# Patient Record
Sex: MALE | Race: WHITE | Employment: OTHER | URBAN - NONMETROPOLITAN AREA
[De-identification: names, ages, dates, MRNs, and addresses within clinical notes are randomized per-mention and may not be internally consistent; named-entity substitution may affect disease eponyms.]

---

## 2017-02-23 DIAGNOSIS — K21.9 GASTROESOPHAGEAL REFLUX DISEASE, ESOPHAGITIS PRESENCE NOT SPECIFIED: ICD-10-CM

## 2017-02-23 RX ORDER — PANTOPRAZOLE SODIUM 40 MG/1
TABLET, DELAYED RELEASE ORAL
Qty: 90 TABLET | Refills: 0 | Status: SHIPPED | OUTPATIENT
Start: 2017-02-23 | End: 2017-07-17 | Stop reason: SDUPTHER

## 2017-07-17 ENCOUNTER — OFFICE VISIT (OUTPATIENT)
Dept: FAMILY MEDICINE CLINIC | Age: 62
End: 2017-07-17
Payer: COMMERCIAL

## 2017-07-17 VITALS
WEIGHT: 200 LBS | SYSTOLIC BLOOD PRESSURE: 138 MMHG | OXYGEN SATURATION: 98 % | BODY MASS INDEX: 27.09 KG/M2 | DIASTOLIC BLOOD PRESSURE: 80 MMHG | HEART RATE: 73 BPM | HEIGHT: 72 IN | TEMPERATURE: 97.7 F

## 2017-07-17 DIAGNOSIS — K90.41 GLUTEN INTOLERANCE: ICD-10-CM

## 2017-07-17 DIAGNOSIS — N52.9 ERECTILE DYSFUNCTION, UNSPECIFIED ERECTILE DYSFUNCTION TYPE: Primary | ICD-10-CM

## 2017-07-17 DIAGNOSIS — M19.90 ARTHRITIS: ICD-10-CM

## 2017-07-17 DIAGNOSIS — K21.9 GASTROESOPHAGEAL REFLUX DISEASE, ESOPHAGITIS PRESENCE NOT SPECIFIED: ICD-10-CM

## 2017-07-17 PROCEDURE — 99214 OFFICE O/P EST MOD 30 MIN: CPT | Performed by: FAMILY MEDICINE

## 2017-07-17 RX ORDER — SILDENAFIL 100 MG/1
100 TABLET, FILM COATED ORAL PRN
Qty: 30 TABLET | Refills: 3 | Status: SHIPPED | OUTPATIENT
Start: 2017-07-17 | End: 2017-07-17 | Stop reason: CLARIF

## 2017-07-17 RX ORDER — DAPSONE 25 MG/1
25 TABLET ORAL DAILY
Qty: 90 TABLET | Refills: 1 | Status: SHIPPED | OUTPATIENT
Start: 2017-07-17

## 2017-07-17 RX ORDER — CELECOXIB 200 MG/1
200 CAPSULE ORAL DAILY
Qty: 60 CAPSULE | Refills: 3 | Status: SHIPPED | OUTPATIENT
Start: 2017-07-17

## 2017-07-17 RX ORDER — PANTOPRAZOLE SODIUM 40 MG/1
TABLET, DELAYED RELEASE ORAL
Qty: 90 TABLET | Refills: 0 | Status: SHIPPED | OUTPATIENT
Start: 2017-07-17 | End: 2017-07-28 | Stop reason: SDUPTHER

## 2017-07-17 RX ORDER — SILDENAFIL 100 MG/1
100 TABLET, FILM COATED ORAL PRN
Qty: 10 TABLET | Refills: 3 | Status: SHIPPED | OUTPATIENT
Start: 2017-07-17

## 2017-07-17 ASSESSMENT — ENCOUNTER SYMPTOMS
HOARSE VOICE: 0
SORE THROAT: 0
BELCHING: 0
SINUS PRESSURE: 0
COUGH: 0
HEARTBURN: 1
CHOKING: 0
ABDOMINAL PAIN: 0
TROUBLE SWALLOWING: 0
NAUSEA: 0

## 2017-07-25 ENCOUNTER — TELEPHONE (OUTPATIENT)
Dept: FAMILY MEDICINE CLINIC | Age: 62
End: 2017-07-25

## 2017-07-28 DIAGNOSIS — K21.9 GASTROESOPHAGEAL REFLUX DISEASE, ESOPHAGITIS PRESENCE NOT SPECIFIED: ICD-10-CM

## 2017-07-28 RX ORDER — PANTOPRAZOLE SODIUM 40 MG/1
TABLET, DELAYED RELEASE ORAL
Qty: 30 TABLET | Refills: 0 | Status: SHIPPED | OUTPATIENT
Start: 2017-07-28

## 2019-01-02 ENCOUNTER — OFFICE VISIT (OUTPATIENT)
Dept: GASTROENTEROLOGY | Facility: CLINIC | Age: 64
End: 2019-01-02

## 2019-01-02 VITALS
HEIGHT: 72 IN | DIASTOLIC BLOOD PRESSURE: 80 MMHG | BODY MASS INDEX: 27.36 KG/M2 | OXYGEN SATURATION: 98 % | HEART RATE: 77 BPM | SYSTOLIC BLOOD PRESSURE: 132 MMHG | WEIGHT: 202 LBS

## 2019-01-02 DIAGNOSIS — Z86.010 HX OF ADENOMATOUS COLONIC POLYPS: Primary | ICD-10-CM

## 2019-01-02 DIAGNOSIS — Z78.9 NONSMOKER: ICD-10-CM

## 2019-01-02 DIAGNOSIS — K60.2 ANAL FISSURE: ICD-10-CM

## 2019-01-02 DIAGNOSIS — R19.4 BOWEL HABIT CHANGES: ICD-10-CM

## 2019-01-02 DIAGNOSIS — K62.5 BRBPR (BRIGHT RED BLOOD PER RECTUM): ICD-10-CM

## 2019-01-02 PROBLEM — Z86.0101 HX OF ADENOMATOUS COLONIC POLYPS: Status: ACTIVE | Noted: 2019-01-02

## 2019-01-02 PROCEDURE — 99204 OFFICE O/P NEW MOD 45 MIN: CPT | Performed by: CLINICAL NURSE SPECIALIST

## 2019-01-02 RX ORDER — PANTOPRAZOLE SODIUM 40 MG/1
TABLET, DELAYED RELEASE ORAL DAILY
COMMUNITY
Start: 2017-07-28 | End: 2021-03-15

## 2019-01-02 RX ORDER — DAPSONE 25 MG/1
25 TABLET ORAL AS NEEDED
COMMUNITY
Start: 2017-07-17 | End: 2022-07-08 | Stop reason: SDUPTHER

## 2019-01-02 NOTE — PROGRESS NOTES
Chao Yadav Jr.  1955 1/2/2019  Chief Complaint   Patient presents with   • GI Problem     Rectal pain     Subjective   HPI  Chao Yadav Jr. is a 63 y.o. male who presents as a referral for complaint of change in bowels that began approx 1 year ago more constipation he had a severe episode at time. He has some rectal bleeding brb at that time small to moderate amount. No certain triggers. It now comes and goes. He tries to manage with diet. He says that he was working on a house and he fell and felt like he pulled something. He has had a rectal exam and he was diagnosed with a fissure and suggested he follow up with us it improved with cream and went away. He says that he did it again and similar symptoms for approx 3 days in December again. He has no complaints of nausea or vomiting. No change in bowels. No wt loss. No melena. There is NO family hx for colon cancer. No abdominal pain.   Past Medical History:   Diagnosis Date   • GERD (gastroesophageal reflux disease)    • Hx of colonic polyp    • Hypercholesteremia      Past Surgical History:   Procedure Laterality Date   • COLONOSCOPY W/ POLYPECTOMY  04/17/2014    Tubular adenoma at 60 cm repeat exam in 3 years   • ENDOSCOPY  04/17/2014    Small HH otherwise normal exam   • KNEE SURGERY      x 2   • LUNG SURGERY      Due to complicated pneumonia   • ROTATOR CUFF REPAIR       Outpatient Medications Marked as Taking for the 1/2/19 encounter (Office Visit) with Esther Moore APRN   Medication Sig Dispense Refill   • dapsone 25 MG tablet Daily.     • pantoprazole (PROTONIX) 40 MG EC tablet Daily.       No Known Allergies  Social History     Socioeconomic History   • Marital status:      Spouse name: Not on file   • Number of children: Not on file   • Years of education: Not on file   • Highest education level: Not on file   Social Needs   • Financial resource strain: Not on file   • Food insecurity - worry: Not on file   • Food  "insecurity - inability: Not on file   • Transportation needs - medical: Not on file   • Transportation needs - non-medical: Not on file   Occupational History   • Not on file   Tobacco Use   • Smoking status: Never Smoker   • Smokeless tobacco: Never Used   Substance and Sexual Activity   • Alcohol use: Yes     Comment: Social   • Drug use: Not on file   • Sexual activity: Not on file   Other Topics Concern   • Not on file   Social History Narrative   • Not on file     Family History   Problem Relation Age of Onset   • Colon cancer Neg Hx    • Colon polyps Neg Hx      Health Maintenance   Topic Date Due   • ANNUAL PHYSICAL  09/20/1957   • HEPATITIS A VACCINE ADULT (1 of 2) 09/20/1972   • TDAP/TD VACCINES (1 - Tdap) 09/20/1973   • ZOSTER VACCINE (1 of 2) 09/20/2004   • HEPATITIS C SCREENING  06/22/2017   • INFLUENZA VACCINE  08/01/2018   • LIPID PANEL  01/02/2019   • COLONOSCOPY  04/17/2024       REVIEW OF SYSTEMS  General: well appearing, no fever chills or sweats, no unexplained wt loss  HEENT: no acute visual or hearing disturbances  Cardiovascular: No chest pain or palpitations  Pulmonary: No shortness of breath, coughing, wheezing or hemoptysis  Abdomen: constipation, rectal bleeding   : No burning, urgency, hematuria, or dysuria  Musculoskeletal: No joint pain or stiffness  Peripheral: no edema  Skin: No lesions or rashes  Neuro: No dizziness, headaches, stroke, syncope  Endocrine: No hot or cold intolerances  Hematological: No blood dyscrasias    Objective   Vitals:    01/02/19 0913   BP: 132/80   Pulse: 77   SpO2: 98%   Weight: 91.6 kg (202 lb)   Height: 182.9 cm (72\")     Body mass index is 27.4 kg/m².  Patient's Body mass index is 27.4 kg/m². BMI is above normal parameters. Recommendations include: nutrition counseling.      PHYSICAL EXAM  General: age appropriate well nourished well appearing, no acute distress  Head: normocephalic and atraumatic  Global assessment-supple  Neck-No JVD noted, no " lymphadenopathy  Pulmonary-clear to auscultation bilaterally, normal respiratory effort  Cardiovascular-normal rate and rhythm, normal heart sounds, S1 and S2 noted  Abdomen-soft, non tender, non distended, normal bowel sounds all 4 quadrants, no hepatosplenomegaly noted  Extremities-No clubbing cyanosis or edema  Neuro-Non focal, converses appropriately, awake, alert, oriented    Assessment/Plan     Chao was seen today for gi problem.    Diagnoses and all orders for this visit:    Hx of adenomatous colonic polyps  -     Case Request; Standing  -     Follow Anesthesia Guidelines / Standing Orders; Future  -     Implement Anesthesia Orders Day of Procedure; Standing  -     Obtain Informed Consent; Standing  -     Verify bowel prep was successful; Standing  -     Case Request    BRBPR (bright red blood per rectum)    Bowel habit changes    Nonsmoker    Anal fissure    Other orders  -     polyethylene glycol (GoLYTELY) 236 g solution; Take as directed by office instructions.    He is past due for his colonoscopy will do this to further evaluate I have suggested a fiber regimen as well as increased water intake.     COLONOSCOPY WITH ANESTHESIA (N/A)  Body mass index is 27.4 kg/m².    Patient instructions on prep prior to procedure provided to the patient.    All risks, benefits, alternatives, and indications of colonoscopy procedure have been discussed with the patient. Risks to include perforation of the colon requiring possible surgery or colostomy, risk of bleeding from biopsies or removal of colon tissue, possibility of missing a colon polyp or cancer, or adverse drug reaction.  Benefits to include the diagnosis and management of disease of the colon and rectum. Alternatives to include barium enema, radiographic evaluation, lab testing or no intervention. Pt verbalizes understanding and agrees.     Esther Moore, APRN  2019  9:34 AM      IF YOU SMOKE OR USE TOBACCO PLEASE READ THE FOLLOWIN minutes  reading provided    Why is smoking bad for me?  Smoking increases the risk of heart disease, lung disease, vascular disease, stroke, and cancer.     If you smoke, STOP!    If you would like more information on quitting smoking, please visit the [employer] website: [email or web address]   This link will provide additional resources including the QUIT line and the Beat the Pack support groups.     For more information:    Quit Now [facility]  1-800-QUIT-NOW  https://[email or web address]/en-US/    Obesity, Adult  Obesity is the condition of having too much total body fat. Being overweight or obese means that your weight is greater than what is considered healthy for your body size. Obesity is determined by a measurement called BMI. BMI is an estimate of body fat and is calculated from height and weight. For adults, a BMI of 30 or higher is considered obese.  Obesity can eventually lead to other health concerns and major illnesses, including:  · Stroke.  · Coronary artery disease (CAD).  · Type 2 diabetes.  · Some types of cancer, including cancers of the colon, breast, uterus, and gallbladder.  · Osteoarthritis.  · High blood pressure (hypertension).  · High cholesterol.  · Sleep apnea.  · Gallbladder stones.  · Infertility problems.  What are the causes?  The main cause of obesity is taking in (consuming) more calories than your body uses for energy. Other factors that contribute to this condition may include:  · Being born with genes that make you more likely to become obese.  · Having a medical condition that causes obesity. These conditions include:  ¨ Hypothyroidism.  ¨ Polycystic ovarian syndrome (PCOS).  ¨ Binge-eating disorder.  ¨ Cushing syndrome.  · Taking certain medicines, such as steroids, antidepressants, and seizure medicines.  · Not being physically active (sedentary lifestyle).  · Living where there are limited places to exercise safely or buy healthy  foods.  · Not getting enough sleep.  What increases the risk?  The following factors may increase your risk of this condition:  · Having a family history of obesity.  · Being a woman of -American descent.  · Being a man of  descent.  What are the signs or symptoms?  Having excessive body fat is the main symptom of this condition.  How is this diagnosed?  This condition may be diagnosed based on:  · Your symptoms.  · Your medical history.  · A physical exam. Your health care provider may measure:  ¨ Your BMI. If you are an adult with a BMI between 25 and less than 30, you are considered overweight. If you are an adult with a BMI of 30 or higher, you are considered obese.  ¨ The distances around your hips and your waist (circumferences). These may be compared to each other to help diagnose your condition.  ¨ Your skinfold thickness. Your health care provider may gently pinch a fold of your skin and measure it.  How is this treated?  Treatment for this condition often includes changing your lifestyle. Treatment may include some or all of the following:  · Dietary changes. Work with your health care provider and a dietitian to set a weight-loss goal that is healthy and reasonable for you. Dietary changes may include eating:  ¨ Smaller portions. A portion size is the amount of a particular food that is healthy for you to eat at one time. This varies from person to person.  ¨ Low-calorie or low-fat options.  ¨ More whole grains, fruits, and vegetables.  · Regular physical activity. This may include aerobic activity (cardio) and strength training.  · Medicine to help you lose weight. Your health care provider may prescribe medicine if you are unable to lose 1 pound a week after 6 weeks of eating more healthily and doing more physical activity.  · Surgery. Surgical options may include gastric banding and gastric bypass. Surgery may be done if:  ¨ Other treatments have not helped to improve your  condition.  ¨ You have a BMI of 40 or higher.  ¨ You have life-threatening health problems related to obesity.  Follow these instructions at home:     Eating and drinking     · Follow recommendations from your health care provider about what you eat and drink. Your health care provider may advise you to:  ¨ Limit fast foods, sweets, and processed snack foods.  ¨ Choose low-fat options, such as low-fat milk instead of whole milk.  ¨ Eat 5 or more servings of fruits or vegetables every day.  ¨ Eat at home more often. This gives you more control over what you eat.  ¨ Choose healthy foods when you eat out.  ¨ Learn what a healthy portion size is.  ¨ Keep low-fat snacks on hand.  ¨ Avoid sugary drinks, such as soda, fruit juice, iced tea sweetened with sugar, and flavored milk.  ¨ Eat a healthy breakfast.  · Drink enough water to keep your urine clear or pale yellow.  · Do not go without eating for long periods of time (do not fast) or follow a fad diet. Fasting and fad diets can be unhealthy and even dangerous.  Physical Activity   · Exercise regularly, as told by your health care provider. Ask your health care provider what types of exercise are safe for you and how often you should exercise.  · Warm up and stretch before being active.  · Cool down and stretch after being active.  · Rest between periods of activity.  Lifestyle   · Limit the time that you spend in front of your TV, computer, or video game system.  · Find ways to reward yourself that do not involve food.  · Limit alcohol intake to no more than 1 drink a day for nonpregnant women and 2 drinks a day for men. One drink equals 12 oz of beer, 5 oz of wine, or 1½ oz of hard liquor.  General instructions   · Keep a weight loss journal to keep track of the food you eat and how much you exercise you get.  · Take over-the-counter and prescription medicines only as told by your health care provider.  · Take vitamins and supplements only as told by your health care  provider.  · Consider joining a support group. Your health care provider may be able to recommend a support group.  · Keep all follow-up visits as told by your health care provider. This is important.  Contact a health care provider if:  · You are unable to meet your weight loss goal after 6 weeks of dietary and lifestyle changes.  This information is not intended to replace advice given to you by your health care provider. Make sure you discuss any questions you have with your health care provider.  Document Released: 01/25/2006 Document Revised: 05/22/2017 Document Reviewed: 10/05/2016  Elsevier Interactive Patient Education © 2017 Elsevier Inc.

## 2019-03-25 ENCOUNTER — ANESTHESIA (OUTPATIENT)
Dept: GASTROENTEROLOGY | Facility: HOSPITAL | Age: 64
End: 2019-03-25

## 2019-03-25 ENCOUNTER — ANESTHESIA EVENT (OUTPATIENT)
Dept: GASTROENTEROLOGY | Facility: HOSPITAL | Age: 64
End: 2019-03-25

## 2019-03-25 ENCOUNTER — HOSPITAL ENCOUNTER (OUTPATIENT)
Facility: HOSPITAL | Age: 64
Setting detail: HOSPITAL OUTPATIENT SURGERY
Discharge: HOME | End: 2019-03-25
Attending: INTERNAL MEDICINE | Admitting: INTERNAL MEDICINE

## 2019-03-25 VITALS
WEIGHT: 197 LBS | DIASTOLIC BLOOD PRESSURE: 90 MMHG | HEART RATE: 65 BPM | RESPIRATION RATE: 13 BRPM | HEIGHT: 72 IN | BODY MASS INDEX: 26.68 KG/M2 | SYSTOLIC BLOOD PRESSURE: 117 MMHG | OXYGEN SATURATION: 98 %

## 2019-03-25 DIAGNOSIS — Z86.010 HX OF ADENOMATOUS COLONIC POLYPS: ICD-10-CM

## 2019-03-25 PROCEDURE — 25010000002 PROPOFOL 10 MG/ML EMULSION: Performed by: NURSE ANESTHETIST, CERTIFIED REGISTERED

## 2019-03-25 PROCEDURE — 45385 COLONOSCOPY W/LESION REMOVAL: CPT | Performed by: INTERNAL MEDICINE

## 2019-03-25 PROCEDURE — 88305 TISSUE EXAM BY PATHOLOGIST: CPT | Performed by: INTERNAL MEDICINE

## 2019-03-25 RX ORDER — LIDOCAINE HYDROCHLORIDE 20 MG/ML
INJECTION, SOLUTION INFILTRATION; PERINEURAL AS NEEDED
Status: DISCONTINUED | OUTPATIENT
Start: 2019-03-25 | End: 2019-03-25 | Stop reason: SURG

## 2019-03-25 RX ORDER — PROPOFOL 10 MG/ML
VIAL (ML) INTRAVENOUS AS NEEDED
Status: DISCONTINUED | OUTPATIENT
Start: 2019-03-25 | End: 2019-03-25 | Stop reason: SURG

## 2019-03-25 RX ORDER — SODIUM CHLORIDE 9 MG/ML
500 INJECTION, SOLUTION INTRAVENOUS CONTINUOUS PRN
Status: DISCONTINUED | OUTPATIENT
Start: 2019-03-25 | End: 2019-03-25 | Stop reason: HOSPADM

## 2019-03-25 RX ORDER — SODIUM CHLORIDE 0.9 % (FLUSH) 0.9 %
3 SYRINGE (ML) INJECTION AS NEEDED
Status: DISCONTINUED | OUTPATIENT
Start: 2019-03-25 | End: 2019-03-25 | Stop reason: HOSPADM

## 2019-03-25 RX ADMIN — SODIUM CHLORIDE 500 ML: 9 INJECTION, SOLUTION INTRAVENOUS at 08:07

## 2019-03-25 RX ADMIN — LIDOCAINE HYDROCHLORIDE 80 MG: 20 INJECTION, SOLUTION INFILTRATION; PERINEURAL at 08:36

## 2019-03-25 RX ADMIN — PROPOFOL 300 MG: 10 INJECTION, EMULSION INTRAVENOUS at 08:36

## 2019-03-25 NOTE — H&P
"[facility]  Pre Procedure History & Physical    Chief Complaint:   Rectal bleeding    Subjective     HPI:   Here for colonoscopy.  Rectal bleeding.  Also with history of adenomatous polyps    Past Medical History:   Past Medical History:   Diagnosis Date   • Arthritis    • GERD (gastroesophageal reflux disease)    • History of transfusion    • Hx of colonic polyp    • Hypercholesteremia        Past Surgical History:  Past Surgical History:   Procedure Laterality Date   • COLONOSCOPY W/ POLYPECTOMY  04/17/2014    Tubular adenoma at 60 cm repeat exam in 3 years   • ENDOSCOPY  04/17/2014    Small HH otherwise normal exam   • KNEE SURGERY      x 2   • LUNG SURGERY      Due to complicated pneumonia   • ROTATOR CUFF REPAIR         Family History:  Family History   Problem Relation Age of Onset   • Colon cancer Neg Hx    • Colon polyps Neg Hx        Social History:   reports that he has never smoked. He has quit using smokeless tobacco. His smokeless tobacco use included chew. He reports that he drinks alcohol.    Medications:   Prior to Admission medications    Medication Sig Start Date End Date Taking? Authorizing Provider   dapsone 25 MG tablet Daily. 7/17/17  Yes ProviderMaverick MD   pantoprazole (PROTONIX) 40 MG EC tablet Daily. 7/28/17  Yes Provider, MD Maverick   polyethylene glycol (GoLYTELY) 236 g solution Take as directed by office instructions. 1/2/19  Yes Esther Moore APRN       Allergies:  Gluten meal and Statins    Objective     Blood pressure 138/83, pulse 73, resp. rate 18, height 182.9 cm (72\"), weight 89.4 kg (197 lb), SpO2 98 %.    Physical Exam   Constitutional: Pt is oriented to person, place, and in no distress.   HENT: Mouth/Throat: Oropharynx is clear.   Cardiovascular: Normal rate, regular rhythm.    Pulmonary/Chest: Effort normal. No respiratory distress. No  wheezes.   Abdominal: Soft. Non-distended.  Skin: Skin is warm and dry.   Psychiatric: Mood, memory, " affect and judgment appear normal.     Assessment/Plan     Diagnosis:  Rectal bleeding    Anticipated Surgical Procedure:    Proceed with colonoscopy as scheduled    The following major R/B/A were discussed with the patient, however the list is not all inclusive . Risk:  Bleeding (immediate and delayed), perforation (rupture or tear), reaction to medication, missed lesion/cancer, pain during the procedure, infection, need for surgery, need for ostomy, need for mechanical ventilation (breathing machine), death.  Benefits: removal of polyp/tissue, burn/clip/or inject to stop bleeding, removal of foreign body, dilate any stricture.  Alternatives: Xray or CT, surgery, do nothing with associated risk   The patient was given time to ask question and received explanation, and agrees to proceed as per History and Physical.   No guarantee given or expressed.    EMR Dragon/transcription disclaimer: Much of this encounter note is an electronic transcription/translation of spoken language to printed text.  The electronic translation of spoken language may permit erroneous, or at times, nonsensical words or phrases to be inadvertently transcribed.  Although I have reviewed the note for such errors, some may still exist.    Kota Silvestre MD  8:36 AM  3/25/2019

## 2019-03-25 NOTE — ANESTHESIA PREPROCEDURE EVALUATION
Anesthesia Evaluation     Patient summary reviewed   no history of anesthetic complications:  NPO Solid Status: > 8 hours             Airway   Mallampati: I  TM distance: >3 FB  Neck ROM: full  Dental      Pulmonary - negative pulmonary ROS   Cardiovascular - negative cardio ROS  Exercise tolerance: excellent (>7 METS)        Neuro/Psych- negative ROS  GI/Hepatic/Renal/Endo    (+)  GERD,      Musculoskeletal     Abdominal    Substance History      OB/GYN          Other                        Anesthesia Plan    ASA 2     MAC     intravenous induction   Anesthetic plan, all risks, benefits, and alternatives have been provided, discussed and informed consent has been obtained with: patient.

## 2019-03-25 NOTE — ANESTHESIA POSTPROCEDURE EVALUATION
Patient: Chao Yadav Jr.    Procedure Summary     Date:  03/25/19 Room / Location:   [facility] /  [facility]    Anesthesia Start:  0834 Anesthesia Stop:  0853    Procedure:  COLONOSCOPY WITH ANESTHESIA (N/A ) Diagnosis:       Hx of adenomatous colonic polyps      (Hx of adenomatous colonic polyps [Z86.010])    Surgeon:  Kota Silvestre MD Provider:  Spencer [place] CRNA    Anesthesia Type:  MAC ASA Status:  2          Anesthesia Type: MAC  Last vitals  BP   117/90 (03/25/19 0910)   Temp       Pulse   65 (03/25/19 0910)   Resp   13 (03/25/19 0910)     SpO2   98 % (03/25/19 0910)     Post Anesthesia Care and Evaluation    Patient location during evaluation: PHASE II  Patient participation: complete - patient participated  Level of consciousness: awake  Pain management: adequate  Airway patency: patent  Anesthetic complications: No anesthetic complications  Respiratory status: acceptable  Hydration status: acceptable

## 2019-03-26 LAB
CYTO UR: NORMAL
LAB AP CASE REPORT: NORMAL
PATH REPORT.FINAL DX SPEC: NORMAL
PATH REPORT.GROSS SPEC: NORMAL

## 2019-05-31 ENCOUNTER — OUTSIDE FACILITY SERVICE (OUTPATIENT)
Dept: PULMONOLOGY | Facility: CLINIC | Age: 64
End: 2019-05-31

## 2019-05-31 ENCOUNTER — HOSPITAL ENCOUNTER (OUTPATIENT)
Dept: PULMONOLOGY | Age: 64
Discharge: HOME | End: 2019-05-31
Payer: COMMERCIAL

## 2019-05-31 PROCEDURE — 94729 DIFFUSING CAPACITY: CPT

## 2019-05-31 PROCEDURE — 94729 DIFFUSING CAPACITY: CPT | Performed by: INTERNAL MEDICINE

## 2019-05-31 PROCEDURE — 6360000002 HC RX W HCPCS: Performed by: INTERNAL MEDICINE

## 2019-05-31 PROCEDURE — 94060 EVALUATION OF WHEEZING: CPT | Performed by: INTERNAL MEDICINE

## 2019-05-31 PROCEDURE — 94727 GAS DIL/WSHOT DETER LNG VOL: CPT

## 2019-05-31 PROCEDURE — 94060 EVALUATION OF WHEEZING: CPT

## 2019-05-31 PROCEDURE — 94727 GAS DIL/WSHOT DETER LNG VOL: CPT | Performed by: INTERNAL MEDICINE

## 2019-05-31 RX ORDER — ALBUTEROL SULFATE 2.5 MG/3ML
2.5 SOLUTION RESPIRATORY (INHALATION) EVERY 6 HOURS PRN
Status: DISCONTINUED | OUTPATIENT
Start: 2019-05-31 | End: 2019-06-02 | Stop reason: HOSPADM

## 2019-05-31 RX ADMIN — ALBUTEROL SULFATE 2.5 MG: 2.5 SOLUTION RESPIRATORY (INHALATION) at 10:53

## 2019-06-04 NOTE — PROCEDURES
[facility]LE[facility] [address] [facility]                               PULMONARY FUNCTION    PATIENT NAME: Travis Yeung                      :        1955  MED REC NO:   [id number]                              ROOM:  ACCOUNT NO:   [de-identified]                           ADMIT DATE: 2019  PROVIDER:     Emilee Jin MD      DATE OF PROCEDURE:  2019    PULMONARY FUNCTION TESTS    IMPRESSION:  1. Spirometry shows borderline results with borderline airflow  obstruction. 2.  Following bronchodilator, spirometry is normal.  3.  Mid flow is reduced. 4.  Maximum ventilation is normal.  5.  Lung volume measurements show elevated residual volume and total  lung capacity suggesting hyperinflation and gas trapping, both.   6.  Diffusion capacity is normal.        Aletha Rutherford MD    D: 2019 16:05:24      T: 2019 16:40:19     [id number]  Job#: [id number]     Doc#: [id number]    CC:

## 2021-03-15 ENCOUNTER — OFFICE VISIT (OUTPATIENT)
Dept: FAMILY MEDICINE CLINIC | Facility: CLINIC | Age: 66
End: 2021-03-15

## 2021-03-15 VITALS
DIASTOLIC BLOOD PRESSURE: 92 MMHG | HEART RATE: 77 BPM | TEMPERATURE: 98.4 F | WEIGHT: 195.5 LBS | BODY MASS INDEX: 26.48 KG/M2 | SYSTOLIC BLOOD PRESSURE: 131 MMHG | OXYGEN SATURATION: 96 % | HEIGHT: 72 IN

## 2021-03-15 DIAGNOSIS — B02.9 HERPES ZOSTER WITHOUT COMPLICATION: Primary | ICD-10-CM

## 2021-03-15 PROCEDURE — 99213 OFFICE O/P EST LOW 20 MIN: CPT | Performed by: NURSE PRACTITIONER

## 2021-03-15 RX ORDER — DEXLANSOPRAZOLE 60 MG/1
60 CAPSULE, DELAYED RELEASE ORAL DAILY
COMMUNITY
End: 2022-01-03

## 2021-03-15 RX ORDER — DICLOFENAC SODIUM AND MISOPROSTOL 75; 200 MG/1; UG/1
1 TABLET, DELAYED RELEASE ORAL AS NEEDED
COMMUNITY
End: 2022-05-11

## 2021-03-15 RX ORDER — VALACYCLOVIR HYDROCHLORIDE 1 G/1
1000 TABLET, FILM COATED ORAL 3 TIMES DAILY
Qty: 21 TABLET | Refills: 0 | Status: SHIPPED | OUTPATIENT
Start: 2021-03-15 | End: 2021-03-22

## 2021-03-15 NOTE — PROGRESS NOTES
Chief Complaint  Herpes Zoster (Patient has shingles on right side of trunk moving around to the front since Friday)    Subjective    History of Present Illness {CC  Problem List  Visit Diagnosis   Encounters  Notes  Medications  Labs  Result Review Imaging  Media :23}     Patient presents to [facility] for   Patient has shingles on right side trunk extending around to the front since Friday, burning pain and uncomfortable    Herpes Zoster  This is a new problem. The current episode started in the past 7 days. The problem occurs constantly. The problem has been gradually worsening. Associated symptoms include a rash. Pertinent negatives include no abdominal pain, arthralgias, chest pain, coughing, diaphoresis, fatigue, fever, myalgias, numbness, sore throat, vomiting or weakness.        Review of Systems   Constitutional: Negative for appetite change, diaphoresis, fatigue and fever.   HENT: Negative for ear pain, hearing loss, mouth sores, sinus pressure, sneezing, sore throat and voice change.    Eyes: Negative for discharge, itching and visual disturbance.   Respiratory: Negative for cough, shortness of breath and wheezing.    Cardiovascular: Negative for chest pain and palpitations.   Gastrointestinal: Negative for abdominal pain, diarrhea and vomiting.   Musculoskeletal: Negative for arthralgias, back pain and myalgias.   Skin: Positive for rash. Negative for bruise.   Neurological: Negative for dizziness, seizures, weakness, numbness and headache.   Hematological: Negative for adenopathy. Does not bruise/bleed easily.   Psychiatric/Behavioral: Negative for agitation, dysphoric mood, sleep disturbance and depressed mood. The patient is not nervous/anxious.        I have reviewed and agree with the HPI and ROS information as above.  Griselda Burrell, APRN     Objective   Vital Signs:   /92 (BP Location: Left arm, Patient Position: Sitting)   Pulse 77   Temp 98.4  "°F (36.9 °C)   Ht 182.9 cm (72\")   Wt 88.7 kg (195 lb 8 oz)   SpO2 96%   BMI 26.51 kg/m²       Physical Exam  Vitals and nursing note reviewed.   Constitutional:       Appearance: Normal appearance. He is well-developed.   HENT:      Head: Normocephalic and atraumatic.      Right Ear: Tympanic membrane, ear canal and external ear normal.      Left Ear: Tympanic membrane, ear canal and external ear normal.      Nose: Nose normal. No septal deviation, nasal tenderness or congestion.      Mouth/Throat:      Lips: Pink. No lesions.      Mouth: Mucous membranes are moist. No oral lesions.      Dentition: Normal dentition.      Pharynx: Oropharynx is clear. No pharyngeal swelling, oropharyngeal exudate or posterior oropharyngeal erythema.   Eyes:      General: Lids are normal. Vision grossly intact. No scleral icterus.        Right eye: No discharge.         Left eye: No discharge.      Extraocular Movements: Extraocular movements intact.      Conjunctiva/sclera: Conjunctivae normal.      Right eye: Right conjunctiva is not injected.      Left eye: Left conjunctiva is not injected.      Pupils: Pupils are equal, round, and reactive to light.   Neck:      Thyroid: No thyroid mass.      Trachea: Trachea normal.   Cardiovascular:      Rate and Rhythm: Normal rate and regular rhythm.      Heart sounds: Normal heart sounds. No murmur. No gallop.    Pulmonary:      Effort: Pulmonary effort is normal.      Breath sounds: Normal breath sounds and air entry. No wheezing, rhonchi or rales.   Musculoskeletal:         General: No tenderness or deformity. Normal range of motion.      Cervical back: Full passive range of motion without pain, normal range of motion and neck supple.      Thoracic back: Normal.      Right lower leg: No edema.      Left lower leg: No edema.   Skin:     General: Skin is warm and dry.      Coloration: Skin is not jaundiced.      Findings: Lesion and rash present. Rash is pustular and vesicular.           "   Comments: Vesicular rash to right upper back   Neurological:      Mental Status: He is alert and oriented to person, place, and time.      Cranial Nerves: Cranial nerves are intact.      Sensory: Sensation is intact.      Motor: Motor function is intact.      Coordination: Coordination is intact.      Gait: Gait is intact.      Deep Tendon Reflexes: Reflexes are normal and symmetric.   Psychiatric:         Mood and Affect: Mood and affect normal.         Behavior: Behavior normal.         Judgment: Judgment normal.                   Assessment and Plan    Patient's Body mass index is 26.51 kg/m². BMI is within normal parameters. No follow-up required..    Problem List Items Addressed This Visit     None      Visit Diagnoses     Herpes zoster without complication    -  Primary    Relevant Medications    valACYclovir (Valtrex) 1000 MG tablet      Patient states that he started noticing a burning sensation to his back starting Friday. He then broke out in painful blisters. He has had shingles before and knew exactly what it was. Will treat with Valtrex at this time. I also sent Shingles B Gone to [facility]. Patient wanted it noted that he had the COVID vaccine 8 days ago also.         Follow Up   Return if symptoms worsen or fail to improve.  Patient was given instructions and counseling regarding his condition or for health maintenance advice. Please see specific information pulled into the AVS if appropriate.

## 2021-05-31 ENCOUNTER — APPOINTMENT (OUTPATIENT)
Dept: GENERAL RADIOLOGY | Facility: HOSPITAL | Age: 66
End: 2021-05-31

## 2021-05-31 ENCOUNTER — HOSPITAL ENCOUNTER (EMERGENCY)
Facility: HOSPITAL | Age: 66
Discharge: HOME | End: 2021-05-31
Attending: EMERGENCY MEDICINE | Admitting: EMERGENCY MEDICINE

## 2021-05-31 VITALS
OXYGEN SATURATION: 96 % | HEART RATE: 64 BPM | TEMPERATURE: 97.9 F | DIASTOLIC BLOOD PRESSURE: 81 MMHG | RESPIRATION RATE: 20 BRPM | WEIGHT: 200 LBS | HEIGHT: 72 IN | SYSTOLIC BLOOD PRESSURE: 128 MMHG | BODY MASS INDEX: 27.09 KG/M2

## 2021-05-31 DIAGNOSIS — K52.9 DIARRHEAL DISEASE: Primary | ICD-10-CM

## 2021-05-31 LAB
ALBUMIN SERPL-MCNC: 4.1 G/DL (ref 3.5–5.2)
ALBUMIN/GLOB SERPL: 2.2 G/DL
ALP SERPL-CCNC: 84 U/L (ref 39–117)
ALT SERPL W P-5'-P-CCNC: 25 U/L (ref 1–41)
ANION GAP SERPL CALCULATED.3IONS-SCNC: 11 MMOL/L (ref 5–15)
AST SERPL-CCNC: 31 U/L (ref 1–40)
BASOPHILS # BLD AUTO: 0.02 10*3/MM3 (ref 0–0.2)
BASOPHILS NFR BLD AUTO: 0.2 % (ref 0–1.5)
BILIRUB SERPL-MCNC: 0.3 MG/DL (ref 0–1.2)
BUN SERPL-MCNC: 13 MG/DL (ref 8–23)
BUN/CREAT SERPL: 12.6 (ref 7–25)
CALCIUM SPEC-SCNC: 8.6 MG/DL (ref 8.6–10.5)
CHLORIDE SERPL-SCNC: 108 MMOL/L (ref 98–107)
CO2 SERPL-SCNC: 23 MMOL/L (ref 22–29)
CREAT SERPL-MCNC: 1.03 MG/DL (ref 0.76–1.27)
DEPRECATED RDW RBC AUTO: 40.6 FL (ref 37–54)
EOSINOPHIL # BLD AUTO: 0.09 10*3/MM3 (ref 0–0.4)
EOSINOPHIL NFR BLD AUTO: 1 % (ref 0.3–6.2)
ERYTHROCYTE [DISTWIDTH] IN BLOOD BY AUTOMATED COUNT: 12.4 % (ref 12.3–15.4)
GFR SERPL CREATININE-BSD FRML MDRD: 72 ML/MIN/1.73
GLOBULIN UR ELPH-MCNC: 1.9 GM/DL
GLUCOSE SERPL-MCNC: 89 MG/DL (ref 65–99)
HCT VFR BLD AUTO: 39.2 % (ref 37.5–51)
HGB BLD-MCNC: 13.3 G/DL (ref 13–17.7)
IMM GRANULOCYTES # BLD AUTO: 0.02 10*3/MM3 (ref 0–0.05)
IMM GRANULOCYTES NFR BLD AUTO: 0.2 % (ref 0–0.5)
LYMPHOCYTES # BLD AUTO: 1.24 10*3/MM3 (ref 0.7–3.1)
LYMPHOCYTES NFR BLD AUTO: 14.2 % (ref 19.6–45.3)
MCH RBC QN AUTO: 30 PG (ref 26.6–33)
MCHC RBC AUTO-ENTMCNC: 33.9 G/DL (ref 31.5–35.7)
MCV RBC AUTO: 88.5 FL (ref 79–97)
MONOCYTES # BLD AUTO: 0.63 10*3/MM3 (ref 0.1–0.9)
MONOCYTES NFR BLD AUTO: 7.2 % (ref 5–12)
NEUTROPHILS NFR BLD AUTO: 6.76 10*3/MM3 (ref 1.7–7)
NEUTROPHILS NFR BLD AUTO: 77.2 % (ref 42.7–76)
NRBC BLD AUTO-RTO: 0 /100 WBC (ref 0–0.2)
PLATELET # BLD AUTO: 207 10*3/MM3 (ref 140–450)
PMV BLD AUTO: 10.3 FL (ref 6–12)
POTASSIUM SERPL-SCNC: 4.1 MMOL/L (ref 3.5–5.2)
PROT SERPL-MCNC: 6 G/DL (ref 6–8.5)
RBC # BLD AUTO: 4.43 10*6/MM3 (ref 4.14–5.8)
SODIUM SERPL-SCNC: 142 MMOL/L (ref 136–145)
TROPONIN T SERPL-MCNC: <0.01 NG/ML (ref 0–0.03)
TROPONIN T SERPL-MCNC: <0.01 NG/ML (ref 0–0.03)
WBC # BLD AUTO: 8.76 10*3/MM3 (ref 3.4–10.8)

## 2021-05-31 PROCEDURE — 99284 EMERGENCY DEPT VISIT MOD MDM: CPT

## 2021-05-31 PROCEDURE — 93005 ELECTROCARDIOGRAM TRACING: CPT | Performed by: EMERGENCY MEDICINE

## 2021-05-31 PROCEDURE — 80053 COMPREHEN METABOLIC PANEL: CPT | Performed by: EMERGENCY MEDICINE

## 2021-05-31 PROCEDURE — 93010 ELECTROCARDIOGRAM REPORT: CPT | Performed by: INTERNAL MEDICINE

## 2021-05-31 PROCEDURE — 85025 COMPLETE CBC W/AUTO DIFF WBC: CPT | Performed by: EMERGENCY MEDICINE

## 2021-05-31 PROCEDURE — 71045 X-RAY EXAM CHEST 1 VIEW: CPT

## 2021-05-31 PROCEDURE — 84484 ASSAY OF TROPONIN QUANT: CPT | Performed by: EMERGENCY MEDICINE

## 2021-05-31 RX ORDER — SODIUM CHLORIDE 0.9 % (FLUSH) 0.9 %
10 SYRINGE (ML) INJECTION AS NEEDED
Status: DISCONTINUED | OUTPATIENT
Start: 2021-05-31 | End: 2021-05-31 | Stop reason: HOSPADM

## 2021-05-31 RX ORDER — ONDANSETRON 4 MG/1
4 TABLET, ORALLY DISINTEGRATING ORAL EVERY 8 HOURS PRN
Qty: 12 TABLET | Refills: 0 | Status: SHIPPED | OUTPATIENT
Start: 2021-05-31 | End: 2021-08-09

## 2021-06-01 LAB
QT INTERVAL: 430 MS
QTC INTERVAL: 464 MS

## 2021-08-09 ENCOUNTER — OFFICE VISIT (OUTPATIENT)
Dept: FAMILY MEDICINE CLINIC | Facility: CLINIC | Age: 66
End: 2021-08-09

## 2021-08-09 ENCOUNTER — LAB (OUTPATIENT)
Dept: LAB | Facility: HOSPITAL | Age: 66
End: 2021-08-09

## 2021-08-09 VITALS
HEART RATE: 62 BPM | DIASTOLIC BLOOD PRESSURE: 80 MMHG | WEIGHT: 202 LBS | HEIGHT: 72 IN | BODY MASS INDEX: 27.36 KG/M2 | OXYGEN SATURATION: 98 % | TEMPERATURE: 96.8 F | RESPIRATION RATE: 20 BRPM | SYSTOLIC BLOOD PRESSURE: 108 MMHG

## 2021-08-09 DIAGNOSIS — Z12.5 SCREENING FOR MALIGNANT NEOPLASM OF PROSTATE: ICD-10-CM

## 2021-08-09 DIAGNOSIS — E55.9 VITAMIN D DEFICIENCY: ICD-10-CM

## 2021-08-09 DIAGNOSIS — Z00.00 MEDICARE ANNUAL WELLNESS VISIT, SUBSEQUENT: ICD-10-CM

## 2021-08-09 DIAGNOSIS — K21.9 GASTROESOPHAGEAL REFLUX DISEASE WITHOUT ESOPHAGITIS: ICD-10-CM

## 2021-08-09 DIAGNOSIS — N52.9 ERECTILE DYSFUNCTION, UNSPECIFIED ERECTILE DYSFUNCTION TYPE: ICD-10-CM

## 2021-08-09 DIAGNOSIS — Z00.00 MEDICARE ANNUAL WELLNESS VISIT, SUBSEQUENT: Primary | ICD-10-CM

## 2021-08-09 LAB
ALBUMIN SERPL-MCNC: 4.5 G/DL (ref 3.5–5)
ALBUMIN/GLOB SERPL: 1.7 G/DL (ref 1.1–2.5)
ALP SERPL-CCNC: 77 U/L (ref 24–120)
ALT SERPL W P-5'-P-CCNC: 23 U/L (ref 0–50)
ANION GAP SERPL CALCULATED.3IONS-SCNC: 7 MMOL/L (ref 4–13)
AST SERPL-CCNC: 33 U/L (ref 7–45)
AUTO MIXED CELLS #: 0.9 10*3/MM3 (ref 0.1–2.6)
AUTO MIXED CELLS %: 9.8 % (ref 0.1–24)
BACTERIA UR QL AUTO: ABNORMAL /HPF
BILIRUB SERPL-MCNC: 0.5 MG/DL (ref 0.1–1)
BILIRUB UR QL STRIP: NEGATIVE
BUN SERPL-MCNC: 17 MG/DL (ref 5–21)
BUN/CREAT SERPL: 18.1
CALCIUM SPEC-SCNC: 9.5 MG/DL (ref 8.4–10.4)
CHLORIDE SERPL-SCNC: 108 MMOL/L (ref 98–110)
CHOLEST SERPL-MCNC: 227 MG/DL (ref 130–200)
CLARITY UR: CLEAR
CO2 SERPL-SCNC: 28 MMOL/L (ref 24–31)
COLOR UR: YELLOW
CREAT SERPL-MCNC: 0.94 MG/DL (ref 0.5–1.4)
ERYTHROCYTE [DISTWIDTH] IN BLOOD BY AUTOMATED COUNT: 12.8 % (ref 12.3–15.4)
GFR SERPL CREATININE-BSD FRML MDRD: 80 ML/MIN/1.73
GLOBULIN UR ELPH-MCNC: 2.6 GM/DL
GLUCOSE SERPL-MCNC: 100 MG/DL (ref 70–100)
GLUCOSE UR STRIP-MCNC: NEGATIVE MG/DL
HCT VFR BLD AUTO: 45.2 % (ref 37.5–51)
HDLC SERPL-MCNC: 63 MG/DL
HGB BLD-MCNC: 15.1 G/DL (ref 13–17.7)
HGB UR QL STRIP.AUTO: NEGATIVE
HYALINE CASTS UR QL AUTO: ABNORMAL /LPF
KETONES UR QL STRIP: NEGATIVE
LDLC SERPL CALC-MCNC: 131 MG/DL (ref 0–99)
LDLC/HDLC SERPL: 2.01 {RATIO}
LEUKOCYTE ESTERASE UR QL STRIP.AUTO: ABNORMAL
LYMPHOCYTES # BLD AUTO: 1.9 10*3/MM3 (ref 0.7–3.1)
LYMPHOCYTES NFR BLD AUTO: 20.5 % (ref 19.6–45.3)
MCH RBC QN AUTO: 30.4 PG (ref 26.6–33)
MCHC RBC AUTO-ENTMCNC: 33.4 G/DL (ref 31.5–35.7)
MCV RBC AUTO: 91.1 FL (ref 79–97)
NEUTROPHILS NFR BLD AUTO: 6.4 10*3/MM3 (ref 1.7–7)
NEUTROPHILS NFR BLD AUTO: 69.7 % (ref 42.7–76)
NITRITE UR QL STRIP: NEGATIVE
PH UR STRIP.AUTO: 6.5 [PH] (ref 5–8)
PLATELET # BLD AUTO: 229 10*3/MM3 (ref 140–450)
PMV BLD AUTO: 9.5 FL (ref 6–12)
POTASSIUM SERPL-SCNC: 4.5 MMOL/L (ref 3.5–5.3)
PROT SERPL-MCNC: 7.1 G/DL (ref 6.3–8.7)
PROT UR QL STRIP: NEGATIVE
RBC # BLD AUTO: 4.96 10*6/MM3 (ref 4.14–5.8)
RBC # UR: ABNORMAL /HPF
REF LAB TEST METHOD: ABNORMAL
SODIUM SERPL-SCNC: 143 MMOL/L (ref 135–145)
SP GR UR STRIP: 1.02 (ref 1–1.03)
SQUAMOUS #/AREA URNS HPF: ABNORMAL /HPF
TRIGL SERPL-MCNC: 188 MG/DL (ref 0–149)
UROBILINOGEN UR QL STRIP: ABNORMAL
VLDLC SERPL-MCNC: 33 MG/DL (ref 5–40)
WBC # BLD AUTO: 9.2 10*3/MM3 (ref 3.4–10.8)
WBC UR QL AUTO: ABNORMAL /HPF

## 2021-08-09 PROCEDURE — 99213 OFFICE O/P EST LOW 20 MIN: CPT | Performed by: PEDIATRICS

## 2021-08-09 PROCEDURE — 85025 COMPLETE CBC W/AUTO DIFF WBC: CPT

## 2021-08-09 PROCEDURE — 82306 VITAMIN D 25 HYDROXY: CPT

## 2021-08-09 PROCEDURE — 81001 URINALYSIS AUTO W/SCOPE: CPT

## 2021-08-09 PROCEDURE — 80061 LIPID PANEL: CPT

## 2021-08-09 PROCEDURE — 84443 ASSAY THYROID STIM HORMONE: CPT

## 2021-08-09 PROCEDURE — 80053 COMPREHEN METABOLIC PANEL: CPT

## 2021-08-09 PROCEDURE — 86803 HEPATITIS C AB TEST: CPT

## 2021-08-09 PROCEDURE — 36415 COLL VENOUS BLD VENIPUNCTURE: CPT

## 2021-08-09 PROCEDURE — G0439 PPPS, SUBSEQ VISIT: HCPCS | Performed by: PEDIATRICS

## 2021-08-09 PROCEDURE — G0103 PSA SCREENING: HCPCS

## 2021-08-09 RX ORDER — SILDENAFIL 100 MG/1
100 TABLET, FILM COATED ORAL DAILY PRN
Qty: 30 TABLET | Refills: 1 | Status: SHIPPED | OUTPATIENT
Start: 2021-08-09 | End: 2022-09-28 | Stop reason: SDUPTHER

## 2021-08-09 NOTE — ASSESSMENT & PLAN NOTE
Counseled on getting daily exercise, nutrition and fall safety    He is going today to get shingles and pneumonia vaccines.

## 2021-08-09 NOTE — PROGRESS NOTES
"The ABCs of the Annual Wellness Visit  Subsequent Medicare Wellness Visit    Chief Complaint   Patient presents with   • Medicare Wellness-subsequent   • Shortness of Breath       Subjective   History of Present Illness:  Chao Yadav Jr. is a 65 y.o. male who presents for a Subsequent Medicare Wellness Visit.    HEALTH RISK ASSESSMENT    Recent Hospitalizations:  No hospitalization(s) within the last year.    Current Medical Providers:  Patient Care Team:  Juanito Biggs MD as PCP - General (Pediatrics)    Smoking Status:  Social History     Tobacco Use   Smoking Status Never Smoker   Smokeless Tobacco Former User   • Types: Chew   Tobacco Comment    quit in the 1980\"s       Alcohol Consumption:  Social History     Substance and Sexual Activity   Alcohol Use Yes    Comment: Social       Depression Screen:   PHQ-2/PHQ-9 Depression Screening 8/9/2021   Little interest or pleasure in doing things 0   Feeling down, depressed, or hopeless 0   Total Score 0       Fall Risk Screen:  LOYDA Fall Risk Assessment was completed, and patient is at MODERATE risk for falls. Assessment completed on:8/9/2021    Health Habits and Functional and Cognitive Screening:  Functional & Cognitive Status 8/9/2021   Do you have difficulty preparing food and eating? No   Do you have difficulty bathing yourself, getting dressed or grooming yourself? No   Do you have difficulty using the toilet? No   Do you have difficulty moving around from place to place? No   Do you have trouble with steps or getting out of a bed or a chair? No   Current Diet Well Balanced Diet   Dental Exam Up to date   Eye Exam Up to date   Exercise (times per week) 3 times per week   Current Exercises Include Walking   Do you need help using the phone?  No   Are you deaf or do you have serious difficulty hearing?  No   Do you need help with transportation? No   Do you need help shopping? No   Do you need help preparing meals?  No   Do you need help with housework?  No "   Do you need help with laundry? No   Do you need help taking your medications? No   Do you need help managing money? No   Do you ever drive or ride in a car without wearing a seat belt? No         Does the patient have evidence of cognitive impairment? No    Asprin use counseling:Does not need ASA (and currently is not on it)    Age-appropriate Screening Schedule:  Refer to the list below for future screening recommendations based on patient's age, sex and/or medical conditions. Orders for these recommended tests are listed in the plan section. The patient has been provided with a written plan.    Health Maintenance   Topic Date Due   • TDAP/TD VACCINES (1 - Tdap) Never done   • ZOSTER VACCINE (1 of 2) 08/09/2022 (Originally 9/20/2004)   • INFLUENZA VACCINE  10/01/2021   • LIPID PANEL  08/09/2022          The following portions of the patient's history were reviewed and updated as appropriate: allergies, current medications, past family history, past medical history, past social history, past surgical history and problem list.    Outpatient Medications Prior to Visit   Medication Sig Dispense Refill   • dapsone 25 MG tablet Take 25 mg by mouth As Needed.     • dexlansoprazole (Dexilant) 60 MG capsule Take 60 mg by mouth Daily.     • diclofenac-miSOPROStol (ARTHROTEC 75) 75-0.2 MG EC tablet Take 1 tablet by mouth As Needed.     • ondansetron ODT (ZOFRAN-ODT) 4 MG disintegrating tablet Place 1 tablet on the tongue Every 8 (Eight) Hours As Needed for Nausea or Vomiting. 12 tablet 0     No facility-administered medications prior to visit.       Patient Active Problem List   Diagnosis   • Hx of adenomatous colonic polyps   • BRBPR (bright red blood per rectum)   • Bowel habit changes   • Nonsmoker   • Anal fissure   • Medicare annual wellness visit, subsequent   • Erectile dysfunction   • Gastroesophageal reflux disease without esophagitis   • Vitamin D deficiency   • Screening for malignant neoplasm of prostate  "      Advanced Care Planning:  ACP discussion was held with the patient during this visit. Patient does not have an advance directive, information provided.    Review of Systems    Compared to one year ago, the patient feels his physical health is the same.  Compared to one year ago, the patient feels his mental health is the same.    Reviewed chart for potential of high risk medication in the elderly: yes  Reviewed chart for potential of harmful drug interactions in the elderly:yes    Objective         Vitals:    08/09/21 1020   BP: 108/80   Pulse: 62   Resp: 20   Temp: 96.8 °F (36 °C)   SpO2: 98%   Weight: 91.6 kg (202 lb)   Height: 182.9 cm (72\")       Body mass index is 27.4 kg/m².  Discussed the patient's BMI with him. The BMI is in the acceptable range.    Physical Exam  Constitutional:       Appearance: Normal appearance. He is well-developed.   HENT:      Head: Normocephalic and atraumatic.      Right Ear: External ear normal.      Left Ear: External ear normal.      Nose: Nose normal. No nasal tenderness or congestion.      Mouth/Throat:      Lips: Pink. No lesions.      Mouth: Mucous membranes are moist. No oral lesions.      Dentition: Normal dentition.      Pharynx: Oropharynx is clear. No pharyngeal swelling, oropharyngeal exudate or posterior oropharyngeal erythema.   Eyes:      General: Lids are normal. Vision grossly intact. No scleral icterus.        Right eye: No discharge.         Left eye: No discharge.      Extraocular Movements: Extraocular movements intact.      Conjunctiva/sclera: Conjunctivae normal.      Right eye: Right conjunctiva is not injected.      Left eye: Left conjunctiva is not injected.      Pupils: Pupils are equal, round, and reactive to light.   Cardiovascular:      Rate and Rhythm: Normal rate and regular rhythm.      Heart sounds: Normal heart sounds. No murmur heard.   No gallop.    Pulmonary:      Effort: Pulmonary effort is normal.      Breath sounds: Normal breath sounds " and air entry. No wheezing, rhonchi or rales.   Abdominal:      General: Abdomen is flat.      Palpations: Abdomen is soft.   Musculoskeletal:         General: No tenderness or deformity. Normal range of motion.      Cervical back: Full passive range of motion without pain, normal range of motion and neck supple.      Right lower leg: No edema.      Left lower leg: No edema.   Skin:     General: Skin is warm and dry.      Coloration: Skin is not jaundiced.      Findings: No rash.   Neurological:      Mental Status: He is alert and oriented to person, place, and time.      Cranial Nerves: Cranial nerves are intact.      Sensory: Sensation is intact.      Motor: Motor function is intact.      Coordination: Coordination is intact.      Gait: Gait is intact.   Psychiatric:         Attention and Perception: Attention normal.         Mood and Affect: Mood and affect normal.         Behavior: Behavior is not hyperactive. Behavior is cooperative.         Thought Content: Thought content normal.         Judgment: Judgment normal.         Lab Results   Component Value Date    TRIG 188 (H) 08/09/2021    HDL 63 08/09/2021     (H) 08/09/2021    VLDL 33 08/09/2021        Assessment/Plan   Medicare Risks and Personalized Health Plan  CMS Preventative Services Quick Reference  Lung Cancer Risk    The above risks/problems have been discussed with the patient.  Pertinent information has been shared with the patient in the After Visit Summary.  Follow up plans and orders are seen below in the Assessment/Plan Section.    Diagnoses and all orders for this visit:    1. Medicare annual wellness visit, subsequent (Primary)  Assessment & Plan:  Counseled on getting daily exercise, nutrition and fall safety    He is going today to get shingles and pneumonia vaccines.    Orders:  -     CBC Auto Differential; Future  -     Comprehensive Metabolic Panel; Future  -     Lipid Panel; Future  -     PSA Screen; Future  -     TSH; Future  -      Vitamin D 25 Hydroxy; Future  -     Urinalysis With Culture If Indicated -; Future  -     Hepatitis C antibody; Future    2. Erectile dysfunction, unspecified erectile dysfunction type  Assessment & Plan:  Refill viagra  No issues     Orders:  -     sildenafil (Viagra) 100 MG tablet; Take 1 tablet by mouth Daily As Needed for Erectile Dysfunction.  Dispense: 30 tablet; Refill: 1    3. Gastroesophageal reflux disease without esophagitis  Assessment & Plan:  Stable on dexilant  No probems since started.      4. Vitamin D deficiency  -     Vitamin D 25 Hydroxy; Future    5. Screening for malignant neoplasm of prostate  -     PSA Screen; Future    Follow Up:  Return in about 1 year (around 8/9/2022) for Annual physical.     An After Visit Summary and PPPS were given to the patient.

## 2021-08-10 LAB
25(OH)D3 SERPL-MCNC: 35.4 NG/ML (ref 30–100)
HCV AB SER DONR QL: NORMAL
PSA SERPL-MCNC: 1.93 NG/ML (ref 0–4)
TSH SERPL DL<=0.05 MIU/L-ACNC: 3.5 UIU/ML (ref 0.27–4.2)

## 2021-12-06 ENCOUNTER — OFFICE VISIT (OUTPATIENT)
Dept: FAMILY MEDICINE CLINIC | Facility: CLINIC | Age: 66
End: 2021-12-06

## 2021-12-06 VITALS
BODY MASS INDEX: 27.36 KG/M2 | TEMPERATURE: 97.8 F | HEART RATE: 82 BPM | RESPIRATION RATE: 16 BRPM | OXYGEN SATURATION: 99 % | WEIGHT: 202 LBS | SYSTOLIC BLOOD PRESSURE: 122 MMHG | DIASTOLIC BLOOD PRESSURE: 74 MMHG | HEIGHT: 72 IN

## 2021-12-06 DIAGNOSIS — G50.0 TRIGEMINAL NEURALGIA OF RIGHT SIDE OF FACE: Primary | ICD-10-CM

## 2021-12-06 PROCEDURE — 99214 OFFICE O/P EST MOD 30 MIN: CPT | Performed by: PEDIATRICS

## 2021-12-06 RX ORDER — GABAPENTIN 100 MG/1
100 CAPSULE ORAL 3 TIMES DAILY
Qty: 90 CAPSULE | Refills: 1 | Status: SHIPPED | OUTPATIENT
Start: 2021-12-06 | End: 2022-05-11

## 2021-12-06 RX ORDER — ALBUTEROL SULFATE 90 UG/1
AEROSOL, METERED RESPIRATORY (INHALATION)
COMMUNITY
Start: 2021-10-04 | End: 2022-05-11

## 2021-12-06 NOTE — PROGRESS NOTES
"Chief Complaint  Ear Problem (He states he has pain in the cartilage and back of ear.  he states it seems to be from where is glasses rub and has happend before.   He states he had his booster for Covid right before it flared up this time. )    Subjective    History of Present Illness      Patient presents to [facility] for   Ear Problem He states he has pain in the cartilage and back of ear. he states it seems to be from where is glasses rub and has happend before. He states he had his booster for Covid right before it flared up this time.      Earache   There is pain in the right ear. This is a chronic problem. The current episode started more than 1 year ago. The problem occurs every few hours. The problem has been waxing and waning. There has been no fever. The pain is at a severity of 5/10. Associated symptoms include headaches.        Review of Systems   HENT: Positive for ear pain.        I have reviewed and agree with the HPI and ROS information as above.  Juanito Biggs MD     Objective   Vital Signs:   /74   Pulse 82   Temp 97.8 °F (36.6 °C)   Resp 16   Ht 182.9 cm (72\")   Wt 91.6 kg (202 lb)   SpO2 99%   BMI 27.40 kg/m²       Physical Exam  Constitutional:       Appearance: Normal appearance. He is normal weight.   HENT:      Head:     Cardiovascular:      Rate and Rhythm: Normal rate and regular rhythm.      Heart sounds: Normal heart sounds.   Pulmonary:      Effort: Pulmonary effort is normal.      Breath sounds: Normal breath sounds.   Neurological:      Mental Status: He is alert.   Psychiatric:         Mood and Affect: Mood normal.         Behavior: Behavior normal.          Result Review  Data Reviewed:                   Assessment and Plan      Problem List Items Addressed This Visit        Neuro    Trigeminal neuralgia of right side of face - Primary    Current Assessment & Plan     Pressure from glasses seems to make worse, it goes away when he takes " off his glasses.            Relevant Medications    gabapentin (NEURONTIN) 100 MG capsule              Follow Up   No follow-ups on file.  Patient was given instructions and counseling regarding his condition or for health maintenance advice. Please see specific information pulled into the AVS if appropriate.       Answers for HPI/ROS submitted by the patient on 12/4/2021  What is the primary reason for your visit?: Ear Pain

## 2022-01-03 ENCOUNTER — OFFICE VISIT (OUTPATIENT)
Dept: FAMILY MEDICINE CLINIC | Facility: CLINIC | Age: 67
End: 2022-01-03

## 2022-01-03 VITALS
HEART RATE: 69 BPM | WEIGHT: 206 LBS | SYSTOLIC BLOOD PRESSURE: 156 MMHG | BODY MASS INDEX: 27.9 KG/M2 | HEIGHT: 72 IN | DIASTOLIC BLOOD PRESSURE: 91 MMHG | OXYGEN SATURATION: 98 % | RESPIRATION RATE: 18 BRPM | TEMPERATURE: 97.8 F

## 2022-01-03 DIAGNOSIS — G50.0 TRIGEMINAL NEURALGIA OF RIGHT SIDE OF FACE: ICD-10-CM

## 2022-01-03 DIAGNOSIS — K21.9 GASTROESOPHAGEAL REFLUX DISEASE WITHOUT ESOPHAGITIS: Primary | ICD-10-CM

## 2022-01-03 PROCEDURE — 99213 OFFICE O/P EST LOW 20 MIN: CPT | Performed by: PEDIATRICS

## 2022-01-03 NOTE — PROGRESS NOTES
"Chief Complaint  Trigeminal Neuralgia (Patient wants to discuss medications)    Subjective    History of Present Illness {CC  Problem List  Visit Diagnosis   Encounters  Notes  Medications  Labs  Result Review Imaging  Media :23}     Patient presents to [facility] for   Patient presents today for one month follow up. He states he'd like to discuss changing medications. He states he has no complaints or concerns.    His symptoms have gone away and he has weaned off it without any return of issues.    He is also wishing to try nexium.       Review of Systems   All other systems reviewed and are negative.      I have reviewed and agree with the HPI and ROS information as above.  Juanito Biggs MD     Objective   Vital Signs:   /91 (BP Location: Right arm, Patient Position: Sitting)   Pulse 69   Temp 97.8 °F (36.6 °C)   Resp 18   Ht 182.9 cm (72\")   Wt 93.4 kg (206 lb)   SpO2 98%   BMI 27.94 kg/m²       Physical Exam  Constitutional:       Appearance: Normal appearance. He is normal weight.   Cardiovascular:      Rate and Rhythm: Normal rate and regular rhythm.      Heart sounds: Normal heart sounds.   Pulmonary:      Effort: Pulmonary effort is normal.      Breath sounds: Normal breath sounds.   Neurological:      Mental Status: He is alert.   Psychiatric:         Mood and Affect: Mood normal.         Behavior: Behavior normal.          Result Review  Data Reviewed:                   Assessment and Plan      Problem List Items Addressed This Visit        Gastrointestinal Abdominal     Gastroesophageal reflux disease without esophagitis - Primary    Relevant Medications    esomeprazole (nexIUM) 20 MG capsule       Neuro    Trigeminal neuralgia of right side of face    Current Assessment & Plan     He is symptom free off meds.                     Follow Up   Return if symptoms worsen or fail to improve.  Patient was given instructions and counseling regarding his " condition or for health maintenance advice. Please see specific information pulled into the AVS if appropriate.

## 2022-01-05 DIAGNOSIS — K21.9 GASTRO-ESOPHAGEAL REFLUX DISEASE WITHOUT ESOPHAGITIS: ICD-10-CM

## 2022-01-05 RX ORDER — DEXLANSOPRAZOLE 60 MG/1
CAPSULE, DELAYED RELEASE ORAL
Qty: 90 CAPSULE | Refills: 3 | Status: SHIPPED | OUTPATIENT
Start: 2022-01-05 | End: 2022-03-07

## 2022-03-07 ENCOUNTER — OFFICE VISIT (OUTPATIENT)
Dept: FAMILY MEDICINE CLINIC | Facility: CLINIC | Age: 67
End: 2022-03-07

## 2022-03-07 VITALS
SYSTOLIC BLOOD PRESSURE: 138 MMHG | OXYGEN SATURATION: 97 % | BODY MASS INDEX: 27.36 KG/M2 | DIASTOLIC BLOOD PRESSURE: 91 MMHG | RESPIRATION RATE: 18 BRPM | HEIGHT: 72 IN | TEMPERATURE: 97 F | HEART RATE: 78 BPM | WEIGHT: 202 LBS

## 2022-03-07 DIAGNOSIS — K21.9 GASTROESOPHAGEAL REFLUX DISEASE WITHOUT ESOPHAGITIS: Primary | ICD-10-CM

## 2022-03-07 PROCEDURE — 99213 OFFICE O/P EST LOW 20 MIN: CPT

## 2022-03-07 RX ORDER — DEXLANSOPRAZOLE 60 MG/1
60 CAPSULE, DELAYED RELEASE ORAL DAILY
Qty: 90 CAPSULE | Refills: 0 | Status: SHIPPED | OUTPATIENT
Start: 2022-03-07 | End: 2022-05-11 | Stop reason: SDUPTHER

## 2022-03-07 NOTE — PROGRESS NOTES
"Chief Complaint  Gastroesophageal Reflux Disease    Subjective    History of Present Illness      Patient presents to [facility] for   gastroesophageal reflux disease medications. He states that the Nexium is not working well and would like to discuss other options.        Review of Systems   Gastrointestinal: Positive for GERD and indigestion.   All other systems reviewed and are negative.      I have reviewed and agree with the HPI and ROS information as above.  Simran Resendiz, APRN     Objective   Vital Signs:   /91 (BP Location: Left arm, Patient Position: Sitting)   Pulse 78   Temp 97 °F (36.1 °C)   Resp 18   Ht 182.9 cm (72.01\")   Wt 91.6 kg (202 lb)   SpO2 97%   BMI 27.39 kg/m²     Patient's Body mass index is 27.39 kg/m². indicating that he is overweight (BMI 25-29.9). Patient's (Body mass index is 27.39 kg/m².) indicates that they are overweight with health conditions that include GERD . Weight is newly identified. BMI is is above average; BMI management plan is completed. We discussed portion control and increasing exercise. .      Physical Exam  Constitutional:       Appearance: Normal appearance. He is well-developed.   HENT:      Head: Normocephalic and atraumatic.      Right Ear: Tympanic membrane, ear canal and external ear normal.      Left Ear: Tympanic membrane, ear canal and external ear normal.      Nose: Nose normal. No septal deviation, nasal tenderness or congestion.      Mouth/Throat:      Lips: Pink. No lesions.      Mouth: Mucous membranes are moist. No oral lesions.      Dentition: Normal dentition.      Pharynx: Oropharynx is clear. No pharyngeal swelling, oropharyngeal exudate or posterior oropharyngeal erythema.   Eyes:      General: Lids are normal. Vision grossly intact. No scleral icterus.        Right eye: No discharge.         Left eye: No discharge.      Extraocular Movements: Extraocular movements intact.      Conjunctiva/sclera: " Conjunctivae normal.      Right eye: Right conjunctiva is not injected.      Left eye: Left conjunctiva is not injected.      Pupils: Pupils are equal, round, and reactive to light.   Neck:      Thyroid: No thyroid mass.      Trachea: Trachea normal.   Cardiovascular:      Rate and Rhythm: Normal rate and regular rhythm.      Heart sounds: Normal heart sounds. No murmur heard.    No gallop.   Pulmonary:      Effort: Pulmonary effort is normal.      Breath sounds: Normal breath sounds and air entry. No wheezing, rhonchi or rales.   Abdominal:      General: There is no distension.      Palpations: Abdomen is soft. There is no mass.      Tenderness: There is no abdominal tenderness. There is no right CVA tenderness, left CVA tenderness, guarding or rebound.   Musculoskeletal:         General: No tenderness or deformity. Normal range of motion.      Cervical back: Full passive range of motion without pain, normal range of motion and neck supple.      Thoracic back: Normal.      Right lower leg: No edema.      Left lower leg: No edema.   Skin:     General: Skin is warm and dry.      Coloration: Skin is not jaundiced.      Findings: No rash.   Neurological:      Mental Status: He is alert and oriented to person, place, and time.      Cranial Nerves: Cranial nerves are intact.      Sensory: Sensation is intact.      Motor: Motor function is intact.      Coordination: Coordination is intact.      Gait: Gait is intact.      Deep Tendon Reflexes: Reflexes are normal and symmetric.   Psychiatric:         Mood and Affect: Mood and affect normal.         Judgment: Judgment normal.          Result Review  Data Reviewed:                   Assessment and Plan      Problem List Items Addressed This Visit        Gastrointestinal Abdominal     Gastroesophageal reflux disease without esophagitis - Primary    Relevant Medications    dexlansoprazole (Dexilant) 60 MG capsule    Other Relevant Orders    Ambulatory Referral to  Gastroenterology      Patient is seen today for GERD. Patient states that he was previously on Dexilant given by Dr. Biggs and it worked excellent for him. He states that he switched to protonix then nexium due to the cost. Patient states that his GERD has gotten worse over the last couple of weeks and he is requesting a script for Dexilant again. He denies any vomiting, of blood but states that he has a brick feeling and indigestion that burns the back of his throat. He has not had an endo in over 5 years. I recommend that we refer to gastro to have him seen and endo UTD due to worsening GERD.     Plan  1. Start Dexilant 60mg previously on  2. Refer to Gastro for UTD endo         Follow Up   Return in about 6 months (around 9/7/2022).  Patient was given instructions and counseling regarding his condition or for health maintenance advice. Please see specific information pulled into the AVS if appropriate.

## 2022-03-11 ENCOUNTER — OFFICE VISIT (OUTPATIENT)
Dept: GASTROENTEROLOGY | Facility: CLINIC | Age: 67
End: 2022-03-11

## 2022-03-11 VITALS
DIASTOLIC BLOOD PRESSURE: 80 MMHG | BODY MASS INDEX: 27.63 KG/M2 | OXYGEN SATURATION: 97 % | WEIGHT: 204 LBS | HEART RATE: 69 BPM | TEMPERATURE: 97 F | SYSTOLIC BLOOD PRESSURE: 130 MMHG | HEIGHT: 72 IN

## 2022-03-11 DIAGNOSIS — R10.13 ABDOMINAL PAIN, EPIGASTRIC: Primary | ICD-10-CM

## 2022-03-11 DIAGNOSIS — R10.13 DYSPEPSIA: ICD-10-CM

## 2022-03-11 DIAGNOSIS — Z78.9 NONSMOKER: ICD-10-CM

## 2022-03-11 PROCEDURE — 99214 OFFICE O/P EST MOD 30 MIN: CPT | Performed by: CLINICAL NURSE SPECIALIST

## 2022-03-11 NOTE — PROGRESS NOTES
"Chao Yadav Jr.  1955    3/11/2022  Chief Complaint   Patient presents with   • GI Problem     Acid reflux getting worse 2 weeks ago     Subjective   HPI  Chao Yadav Jr. is a 66 y.o. male who presents with a complaint of acid reflux severe progressive ongoing for approx 2 weeks. He says that he was down on the ground working got up and felt something \"pop\" since that time he says that he feels like his food it getting stuck. And a pain to the epigastric region. He says that he feels like his stomach is coming up into his esophagus. Some nausea no vomiting. No wt loss no fever chills or sweats. He says that his epigastric pain is an ache. He says he did this once before and his doctor adjusted him and it helped him. He had endoscopy in 2014 and he was noted to have a small hiatal hernia. No melena. No BRBPR.     Past Medical History:   Diagnosis Date   • Arthritis    • GERD (gastroesophageal reflux disease)    • History of transfusion    • Hx of colonic polyp    • Hypercholesteremia    • Shingles      Past Surgical History:   Procedure Laterality Date   • COLONOSCOPY N/A 03/25/2019    Tubular adenoma at 60 cm repeat exam in 5 years   • COLONOSCOPY W/ POLYPECTOMY  04/17/2014    Tubular adenoma at 60 cm repeat exam in 3 years   • ENDOSCOPY  04/17/2014    Small HH otherwise normal exam   • KNEE SURGERY      x 2   • LUNG SURGERY      Due to complicated pneumonia   • ROTATOR CUFF REPAIR         Outpatient Medications Marked as Taking for the 3/11/22 encounter (Office Visit) with Esther Moore APRN   Medication Sig Dispense Refill   • dapsone 25 MG tablet Take 25 mg by mouth As Needed.     • dexlansoprazole (Dexilant) 60 MG capsule Take 1 capsule by mouth Daily for 90 days. 90 capsule 0     Allergies   Allergen Reactions   • Gluten Meal Other (See Comments)     Skin rash dermitis   • Statins Other (See Comments)     Tried  2 different meds. Had muscle aches with each     Social History " "    Socioeconomic History   • Marital status:    Tobacco Use   • Smoking status: Former Smoker   • Smokeless tobacco: Former User     Types: Chew   • Tobacco comment: quit in the 1980\"s   Vaping Use   • Vaping Use: Never used   Substance and Sexual Activity   • Alcohol use: Yes     Comment: Social   • Drug use: Never   • Sexual activity: Defer     Family History   Problem Relation Age of Onset   • Colon cancer Neg Hx    • Colon polyps Neg Hx    • Esophageal cancer Neg Hx      Health Maintenance   Topic Date Due   • COVID-19 Vaccine (2 - Moderna 3-dose series) 11/30/2021   • ZOSTER VACCINE (2 of 2) 05/17/2022 (Originally 10/4/2021)   • Pneumococcal Vaccine 65+ (1 of 1 - PPSV23) 03/07/2023 (Originally 9/20/2019)   • ANNUAL WELLNESS VISIT  08/09/2022   • LIPID PANEL  08/09/2022   • COLORECTAL CANCER SCREENING  03/25/2024   • TDAP/TD VACCINES (2 - Td or Tdap) 03/14/2028   • HEPATITIS C SCREENING  Completed   • INFLUENZA VACCINE  Completed     Review of Systems   Constitutional: Negative for activity change, appetite change, chills, diaphoresis, fatigue, fever and unexpected weight change.   HENT: Negative for ear pain, hearing loss, mouth sores, sore throat, trouble swallowing and voice change.    Eyes: Negative.    Respiratory: Negative for cough, choking, shortness of breath and wheezing.    Cardiovascular: Negative for chest pain and palpitations.   Gastrointestinal: Positive for abdominal pain and nausea. Negative for blood in stool, constipation, diarrhea and vomiting.   Endocrine: Negative for cold intolerance and heat intolerance.   Genitourinary: Negative for decreased urine volume, dysuria, frequency, hematuria and urgency.   Musculoskeletal: Negative for back pain, gait problem and myalgias.   Skin: Negative for color change, pallor and rash.   Allergic/Immunologic: Negative for food allergies and immunocompromised state.   Neurological: Negative for dizziness, tremors, seizures, syncope, weakness, " "light-headedness, numbness and headaches.   Hematological: Negative for adenopathy. Does not bruise/bleed easily.   Psychiatric/Behavioral: Negative for agitation and confusion. The patient is not nervous/anxious.    All other systems reviewed and are negative.    Objective   Vitals:    03/11/22 0901   BP: 130/80   Pulse: 69   Temp: 97 °F (36.1 °C)   SpO2: 97%   Weight: 92.5 kg (204 lb)   Height: 182.9 cm (72\")     Body mass index is 27.67 kg/m².  Physical Exam  Constitutional:       Appearance: He is well-developed.   HENT:      Head: Normocephalic and atraumatic.   Eyes:      Pupils: Pupils are equal, round, and reactive to light.   Neck:      Trachea: No tracheal deviation.   Cardiovascular:      Rate and Rhythm: Normal rate and regular rhythm.      Heart sounds: Normal heart sounds. No murmur heard.    No friction rub. No gallop.   Pulmonary:      Effort: Pulmonary effort is normal. No respiratory distress.      Breath sounds: Normal breath sounds. No wheezing or rales.   Chest:      Chest wall: No tenderness.   Abdominal:      General: Bowel sounds are normal. There is no distension.      Palpations: Abdomen is soft. Abdomen is not rigid.      Tenderness: There is no abdominal tenderness. There is no guarding or rebound.   Musculoskeletal:         General: No tenderness or deformity. Normal range of motion.      Cervical back: Normal range of motion and neck supple.   Skin:     General: Skin is warm and dry.      Coloration: Skin is not pale.      Findings: No rash.   Neurological:      Mental Status: He is alert and oriented to person, place, and time.      Deep Tendon Reflexes: Reflexes are normal and symmetric.   Psychiatric:         Behavior: Behavior normal.         Thought Content: Thought content normal.         Judgment: Judgment normal.       Assessment/Plan   Diagnoses and all orders for this visit:    1. Abdominal pain, epigastric (Primary)  -     Case Request; Standing  -     COVID PRE-OP / " PRE-PROCEDURE SCREENING ORDER (NO ISOLATION) - Swab, Nasal Cavity; Future  -     Follow Anesthesia Guidelines / Standing Orders; Future  -     Obtain Informed Consent; Future  -     Case Request    2. Dyspepsia    3. Nonsmoker    I discussed non pharmaceutical treatment of gerd.  This includes gradually losing weight to achieve ideal body wt., elevation of the head of bed by 4-6 inches, nothing to eat or drink 3 hours prior to lying down, avoiding tight clothing, stress reduction, tobacco cessation, reduction of alcohol intake, and dietary restrictions (avoiding caffeine, coffee, fatty foods, mints, chocolate, spicy foods and tomato based sauces as much as possible).    Continue Dexilant  Add Gaviscon    ESOPHAGOGASTRODUODENOSCOPY WITH ANESTHESIA (N/A)  Part of this note may be an electronic transcription/translation of spoken language to printed text using the Dragon Dictation System.  Body mass index is 27.67 kg/m².  No follow-ups on file.    Patient's Body mass index is 27.67 kg/m². indicating that he is overweight (BMI 25-29.9). Patient's (Body mass index is 27.67 kg/m².) indicates that they are overweight with health conditions that include none . Weight is unchanged. BMI is is above average; BMI management plan is completed. We discussed portion control and increasing exercise. .      All risks, benefits, alternatives, and indications of colonoscopy and/or Endoscopy procedure have been discussed with the patient. Risks to include perforation of the colon requiring possible surgery or colostomy, risk of bleeding from biopsies or removal of colon tissue, possibility of missing a colon polyp or cancer, or adverse drug reaction.  Benefits to include the diagnosis and management of disease of the colon and rectum. Alternatives to include barium enema, radiographic evaluation, lab testing or no intervention. Pt verbalizes understanding and agrees.     Esther Moore, APRN  3/11/2022  09:23 CST          If you  smoke or use tobacco, 4 minutes reading provided  Steps to Quit Smoking  Smoking tobacco can be harmful to your health and can affect almost every organ in your body. Smoking puts you, and those around you, at risk for developing many serious chronic diseases. Quitting smoking is difficult, but it is one of the best things that you can do for your health. It is never too late to quit.  What are the benefits of quitting smoking?  When you quit smoking, you lower your risk of developing serious diseases and conditions, such as:  · Lung cancer or lung disease, such as COPD.  · Heart disease.  · Stroke.  · Heart attack.  · Infertility.  · Osteoporosis and bone fractures.  Additionally, symptoms such as coughing, wheezing, and shortness of breath may get better when you quit. You may also find that you get sick less often because your body is stronger at fighting off colds and infections. If you are pregnant, quitting smoking can help to reduce your chances of having a baby of low birth weight.  How do I get ready to quit?  When you decide to quit smoking, create a plan to make sure that you are successful. Before you quit:  · Pick a date to quit. Set a date within the next two weeks to give you time to prepare.  · Write down the reasons why you are quitting. Keep this list in places where you will see it often, such as on your bathroom mirror or in your car or wallet.  · Identify the people, places, things, and activities that make you want to smoke (triggers) and avoid them. Make sure to take these actions:  ¨ Throw away all cigarettes at home, at work, and in your car.  ¨ Throw away smoking accessories, such as ashtrays and lighters.  ¨ Clean your car and make sure to empty the ashtray.  ¨ Clean your home, including curtains and carpets.  · Tell your family, friends, and coworkers that you are quitting. Support from your loved ones can make quitting easier.  · Talk with your health care provider about your options for  quitting smoking.  · Find out what treatment options are covered by your health insurance.  What strategies can I use to quit smoking?  Talk with your healthcare provider about different strategies to quit smoking. Some strategies include:  · Quitting smoking altogether instead of gradually lessening how much you smoke over a period of time. Research shows that quitting “cold turkey” is more successful than gradually quitting.  · Attending in-person counseling to help you build problem-solving skills. You are more likely to have success in quitting if you attend several counseling sessions. Even short sessions of 10 minutes can be effective.  · Finding resources and support systems that can help you to quit smoking and remain smoke-free after you quit. These resources are most helpful when you use them often. They can include:  ¨ Online chats with a counselor.  ¨ Telephone quitlines.  ¨ Printed self-help materials.  ¨ Support groups or group counseling.  ¨ Text messaging programs.  ¨ Mobile phone applications.  · Taking medicines to help you quit smoking. (If you are pregnant or breastfeeding, talk with your health care provider first.) Some medicines contain nicotine and some do not. Both types of medicines help with cravings, but the medicines that include nicotine help to relieve withdrawal symptoms. Your health care provider may recommend:  ¨ Nicotine patches, gum, or lozenges.  ¨ Nicotine inhalers or sprays.  ¨ Non-nicotine medicine that is taken by mouth.  Talk with your health care provider about combining strategies, such as taking medicines while you are also receiving in-person counseling. Using these two strategies together makes you more likely to succeed in quitting than if you used either strategy on its own.  If you are pregnant or breastfeeding, talk with your health care provider about finding counseling or other support strategies to quit smoking. Do not take medicine to help you quit smoking  unless told to do so by your health care provider.  What things can I do to make it easier to quit?  Quitting smoking might feel overwhelming at first, but there is a lot that you can do to make it easier. Take these important actions:  · Reach out to your family and friends and ask that they support and encourage you during this time. Call telephone quitlines, reach out to support groups, or work with a counselor for support.  · Ask people who smoke to avoid smoking around you.  · Avoid places that trigger you to smoke, such as bars, parties, or smoke-break areas at work.  · Spend time around people who do not smoke.  · Lessen stress in your life, because stress can be a smoking trigger for some people. To lessen stress, try:  ¨ Exercising regularly.  ¨ Deep-breathing exercises.  ¨ Yoga.  ¨ Meditating.  ¨ Performing a body scan. This involves closing your eyes, scanning your body from head to toe, and noticing which parts of your body are particularly tense. Purposefully relax the muscles in those areas.  · Download or purchase mobile phone or tablet apps (applications) that can help you stick to your quit plan by providing reminders, tips, and encouragement. There are many free apps, such as QuitGuide from the CDC (Centers for Disease Control and Prevention). You can find other support for quitting smoking (smoking cessation) through smokefree.gov and other websites.  How will I feel when I quit smoking?  Within the first 24 hours of quitting smoking, you may start to feel some withdrawal symptoms. These symptoms are usually most noticeable 2-3 days after quitting, but they usually do not last beyond 2-3 weeks. Changes or symptoms that you might experience include:  · Mood swings.  · Restlessness, anxiety, or irritation.  · Difficulty concentrating.  · Dizziness.  · Strong cravings for sugary foods in addition to nicotine.  · Mild weight gain.  · Constipation.  · Nausea.  · Coughing or a sore throat.  · Changes in  how your medicines work in your body.  · A depressed mood.  · Difficulty sleeping (insomnia).  After the first 2-3 weeks of quitting, you may start to notice more positive results, such as:  · Improved sense of smell and taste.  · Decreased coughing and sore throat.  · Slower heart rate.  · Lower blood pressure.  · Clearer skin.  · The ability to breathe more easily.  · Fewer sick days.  Quitting smoking is very challenging for most people. Do not get discouraged if you are not successful the first time. Some people need to make many attempts to quit before they achieve long-term success. Do your best to stick to your quit plan, and talk with your health care provider if you have any questions or concerns.  This information is not intended to replace advice given to you by your health care provider. Make sure you discuss any questions you have with your health care provider.  Document Released: 12/12/2002 Document Revised: 08/15/2017 Document Reviewed: 05/03/2016  Elsevier Interactive Patient Education © 2017 [employer]

## 2022-05-11 ENCOUNTER — HOSPITAL ENCOUNTER (OUTPATIENT)
Dept: CT IMAGING | Facility: HOSPITAL | Age: 67
Discharge: HOME | End: 2022-05-11
Admitting: PEDIATRICS

## 2022-05-11 ENCOUNTER — OFFICE VISIT (OUTPATIENT)
Dept: FAMILY MEDICINE CLINIC | Facility: CLINIC | Age: 67
End: 2022-05-11

## 2022-05-11 VITALS
TEMPERATURE: 97.1 F | HEART RATE: 68 BPM | DIASTOLIC BLOOD PRESSURE: 68 MMHG | SYSTOLIC BLOOD PRESSURE: 118 MMHG | BODY MASS INDEX: 27.74 KG/M2 | OXYGEN SATURATION: 98 % | WEIGHT: 204.8 LBS | RESPIRATION RATE: 16 BRPM | HEIGHT: 72 IN

## 2022-05-11 DIAGNOSIS — K21.9 HIATAL HERNIA WITH GERD: ICD-10-CM

## 2022-05-11 DIAGNOSIS — K44.9 HIATAL HERNIA WITH GERD: ICD-10-CM

## 2022-05-11 DIAGNOSIS — K21.00 GASTROESOPHAGEAL REFLUX DISEASE WITH ESOPHAGITIS WITHOUT HEMORRHAGE: ICD-10-CM

## 2022-05-11 DIAGNOSIS — K21.00 GASTROESOPHAGEAL REFLUX DISEASE WITH ESOPHAGITIS WITHOUT HEMORRHAGE: Primary | ICD-10-CM

## 2022-05-11 LAB — CREAT BLDA-MCNC: 0.9 MG/DL (ref 0.6–1.3)

## 2022-05-11 PROCEDURE — 99214 OFFICE O/P EST MOD 30 MIN: CPT | Performed by: PEDIATRICS

## 2022-05-11 PROCEDURE — 74178 CT ABD&PLV WO CNTR FLWD CNTR: CPT

## 2022-05-11 PROCEDURE — 25010000002 IOPAMIDOL 61 % SOLUTION: Performed by: PEDIATRICS

## 2022-05-11 PROCEDURE — 82565 ASSAY OF CREATININE: CPT

## 2022-05-11 RX ORDER — DEXLANSOPRAZOLE 60 MG/1
60 CAPSULE, DELAYED RELEASE ORAL DAILY
Qty: 90 CAPSULE | Refills: 0 | Status: SHIPPED | OUTPATIENT
Start: 2022-05-11 | End: 2022-05-24 | Stop reason: SDUPTHER

## 2022-05-11 RX ORDER — DEXLANSOPRAZOLE 60 MG/1
60 CAPSULE, DELAYED RELEASE ORAL DAILY
Qty: 90 CAPSULE | Refills: 0 | Status: SHIPPED | OUTPATIENT
Start: 2022-05-11 | End: 2022-05-11 | Stop reason: SDUPTHER

## 2022-05-11 RX ADMIN — IOPAMIDOL 100 ML: 612 INJECTION, SOLUTION INTRAVENOUS at 16:40

## 2022-05-11 RX ADMIN — IOPAMIDOL 50 ML: 612 INJECTION, SOLUTION INTRAVENOUS at 16:41

## 2022-05-11 NOTE — PROGRESS NOTES
"Chief Complaint  Heartburn (Med refill.  He states he has a scope scheduled soon.  He states he has been having a lot of trouble with flare ups. )    Subjective    History of Present Illness      Patient presents to [facility] for   Heartburn Med refill.  He states he has a scope scheduled soon.  He states he has been having a lot of trouble with flare ups.            Review of Systems    I have reviewed and agree with the HPI information as above.  Juanito Biggs MD     Objective   Vital Signs:   /68   Pulse 68   Temp 97.1 °F (36.2 °C)   Resp 16   Ht 182.9 cm (72\")   Wt 92.9 kg (204 lb 12.8 oz)   SpO2 98%   BMI 27.78 kg/m²     BMI is >= 25 and < 30. (Overweight) The following options were offered after discussion: nutrition counseling/recommendations      Physical Exam  Constitutional:       Appearance: Normal appearance. He is normal weight.   Cardiovascular:      Rate and Rhythm: Normal rate and regular rhythm.      Heart sounds: Normal heart sounds.   Pulmonary:      Effort: Pulmonary effort is normal.      Breath sounds: Normal breath sounds.   Chest:       Neurological:      Mental Status: He is alert.   Psychiatric:         Mood and Affect: Mood normal.         Behavior: Behavior normal.          LANG-7:      PHQ-2 Depression Screening  Little interest or pleasure in doing things? 0-->not at all   Feeling down, depressed, or hopeless? 0-->not at all   PHQ-2 Total Score 0     PHQ-9 Depression Screening  Little interest or pleasure in doing things? 0-->not at all   Feeling down, depressed, or hopeless? 0-->not at all   Trouble falling or staying asleep, or sleeping too much?     Feeling tired or having little energy?     Poor appetite or overeating?     Feeling bad about yourself - or that you are a failure or have let yourself or your family down?     Trouble concentrating on things, such as reading the newspaper or watching television?     Moving or speaking so slowly " that other people could have noticed? Or the opposite - being so fidgety or restless that you have been moving around a lot more than usual?     Thoughts that you would be better off dead, or of hurting yourself in some way?     PHQ-9 Total Score 0   If you checked off any problems, how difficult have these problems made it for you to do your work, take care of things at home, or get along with other people?        Result Review  Data Reviewed:                   Assessment and Plan      Problem List Items Addressed This Visit        Gastrointestinal Abdominal     Hiatal hernia with GERD    Current Assessment & Plan     I viewed his ct scan and I saw a small hh as evidenced by air in the esophagus.  Will  on elevate hob and not grunting or straining and to continue the dexilant and keep appt with gi              Other    Gastroesophageal reflux disease with esophagitis without hemorrhage - Primary    Current Assessment & Plan     Was very well controlled w dexilant then had episode of straining at work with wrench and he felt a pop sensation mid epigastric and has been miserable with discomfort since.   dexilant that was working well has totally been ineffective  Has a egd scheduled in 6 weeks.  I am worried about hiatal hernia as well and will get imaging to check.  After event he had copious reflux went into chest and also caused significant hoarseness for quite some time.  He is now able to eat and swallow, he feels like steak hangs up when he eats this.           Relevant Orders    CT Abdomen Pelvis With & Without Contrast (Completed)              Follow Up   Return in about 2 months (around 7/11/2022) for Recheck.  Patient was given instructions and counseling regarding his condition or for health maintenance advice. Please see specific information pulled into the AVS if appropriate.       Answers for HPI/ROS submitted by the patient on 5/10/2022  What is the primary reason for your visit?: Abdominal  Pain

## 2022-05-11 NOTE — ASSESSMENT & PLAN NOTE
Was very well controlled w dexilant then had episode of straining at work with wrench and he felt a pop sensation mid epigastric and has been miserable with discomfort since.   dexilant that was working well has totally been ineffective  Has a egd scheduled in 6 weeks.  I am worried about hiatal hernia as well and will get imaging to check.  After event he had copious reflux went into chest and also caused significant hoarseness for quite some time.  He is now able to eat and swallow, he feels like steak hangs up when he eats this.

## 2022-05-11 NOTE — ASSESSMENT & PLAN NOTE
I viewed his ct scan and I saw a small hh as evidenced by air in the esophagus.  Will  on elevate hob and not grunting or straining and to continue the dexilant and keep appt with gi

## 2022-05-24 RX ORDER — DEXLANSOPRAZOLE 60 MG/1
60 CAPSULE, DELAYED RELEASE ORAL DAILY
Qty: 90 CAPSULE | Refills: 0 | Status: SHIPPED | OUTPATIENT
Start: 2022-05-24 | End: 2022-07-08 | Stop reason: SDUPTHER

## 2022-07-08 ENCOUNTER — OFFICE VISIT (OUTPATIENT)
Dept: FAMILY MEDICINE CLINIC | Facility: CLINIC | Age: 67
End: 2022-07-08

## 2022-07-08 VITALS
BODY MASS INDEX: 26.95 KG/M2 | WEIGHT: 199 LBS | DIASTOLIC BLOOD PRESSURE: 80 MMHG | RESPIRATION RATE: 20 BRPM | HEIGHT: 72 IN | SYSTOLIC BLOOD PRESSURE: 129 MMHG | HEART RATE: 80 BPM | TEMPERATURE: 96.9 F

## 2022-07-08 DIAGNOSIS — R21 RASH: ICD-10-CM

## 2022-07-08 DIAGNOSIS — M72.0 DUPUYTREN CONTRACTURE: ICD-10-CM

## 2022-07-08 DIAGNOSIS — K21.00 GASTROESOPHAGEAL REFLUX DISEASE WITH ESOPHAGITIS WITHOUT HEMORRHAGE: ICD-10-CM

## 2022-07-08 DIAGNOSIS — K21.9 GASTROESOPHAGEAL REFLUX DISEASE WITHOUT ESOPHAGITIS: Primary | ICD-10-CM

## 2022-07-08 PROCEDURE — 99214 OFFICE O/P EST MOD 30 MIN: CPT | Performed by: NURSE PRACTITIONER

## 2022-07-08 RX ORDER — DAPSONE 25 MG/1
25 TABLET ORAL AS NEEDED
Qty: 30 TABLET | Refills: 5 | Status: SHIPPED | OUTPATIENT
Start: 2022-07-08

## 2022-07-08 RX ORDER — DEXLANSOPRAZOLE 60 MG/1
60 CAPSULE, DELAYED RELEASE ORAL DAILY
Qty: 90 CAPSULE | Refills: 0 | Status: SHIPPED | OUTPATIENT
Start: 2022-07-08 | End: 2022-10-06

## 2022-07-08 NOTE — PROGRESS NOTES
"Chief Complaint  f/u GERD    Subjective    History of Present Illness      Patient presents to [facility] for   Pt is her for a f/u with GERD and would like a refill on prescription. Pt is doing well with medication.        Review of Systems    I have reviewed and agree with the HPI information as above.  Griselda Burrell, APRN     Objective   Vital Signs:   /80   Pulse 80   Temp 96.9 °F (36.1 °C)   Resp 20   Ht 182.9 cm (72\")   Wt 90.3 kg (199 lb)   BMI 26.99 kg/m²     BMI is >= 25 and <30. (Overweight) The following options were offered after discussion;: weight loss educational material (shared in after visit summary), exercise counseling/recommendations and nutrition counseling/recommendations      Physical Exam  Vitals and nursing note reviewed.   Constitutional:       Appearance: Normal appearance. He is well-developed.   HENT:      Head: Normocephalic and atraumatic.      Right Ear: Tympanic membrane, ear canal and external ear normal.      Left Ear: Tympanic membrane, ear canal and external ear normal.      Nose: Nose normal. No septal deviation, nasal tenderness or congestion.      Mouth/Throat:      Lips: Pink. No lesions.      Mouth: Mucous membranes are moist. No oral lesions.      Dentition: Normal dentition.      Pharynx: Oropharynx is clear. No pharyngeal swelling, oropharyngeal exudate or posterior oropharyngeal erythema.   Eyes:      General: Lids are normal. Vision grossly intact. No scleral icterus.        Right eye: No discharge.         Left eye: No discharge.      Extraocular Movements: Extraocular movements intact.      Conjunctiva/sclera: Conjunctivae normal.      Right eye: Right conjunctiva is not injected.      Left eye: Left conjunctiva is not injected.      Pupils: Pupils are equal, round, and reactive to light.   Neck:      Thyroid: No thyroid mass.      Trachea: Trachea normal.   Cardiovascular:      Rate and Rhythm: Normal rate and " regular rhythm.      Heart sounds: Normal heart sounds. No murmur heard.    No gallop.   Pulmonary:      Effort: Pulmonary effort is normal.      Breath sounds: Normal breath sounds and air entry. No wheezing, rhonchi or rales.   Musculoskeletal:         General: No tenderness or deformity. Normal range of motion.      Cervical back: Full passive range of motion without pain, normal range of motion and neck supple.      Thoracic back: Normal.      Right lower leg: No edema.      Left lower leg: No edema.   Skin:     General: Skin is warm and dry.      Coloration: Skin is not jaundiced.      Findings: No rash.   Neurological:      Mental Status: He is alert and oriented to person, place, and time.      Cranial Nerves: Cranial nerves are intact.      Sensory: Sensation is intact.      Motor: Motor function is intact.      Coordination: Coordination is intact.      Gait: Gait is intact.      Deep Tendon Reflexes: Reflexes are normal and symmetric.   Psychiatric:         Mood and Affect: Mood and affect normal.         Behavior: Behavior normal.         Judgment: Judgment normal.               Result Review  Data Reviewed:                   Assessment and Plan      Problem List Items Addressed This Visit        Gastrointestinal Abdominal     Gastroesophageal reflux disease without esophagitis - Primary    Relevant Medications    dexlansoprazole (Dexilant) 60 MG capsule       Other    Gastroesophageal reflux disease with esophagitis without hemorrhage    Relevant Medications    dexlansoprazole (Dexilant) 60 MG capsule      Other Visit Diagnoses     Dupuytren contracture        Relevant Orders    Ambulatory Referral to Hand Surgery    Rash        Relevant Medications    dapsone 25 MG tablet        Patient is here today mainly to get a refill on his Dexilant.  He states that he is doing well with that and wishes to continue the same.  Patient states that he was doing research about his hand.  He states that he feels like  he has Dupuytren contracture to his right hand and mildly to his left hand.  He was wanting to know if there was treatment for this.  Will refer to hand specialist at this time.  Patient also has a gluten sensitivity and will break out in a rash randomly.  He will take dapsone for this as needed.  Needs a refill on this.  Patient also wanted us to know that he did test positive for COVID in the middle of June.  Had very mild symptoms and has no residual.  Plan:  1. Refill dexilant  2. Refill dapsone  3. Refer to Dr Kathleen the hand specialist  4. Patient will be due for a yearly physical in the next month, he has agreed to make an appointment for this.       Follow Up   Return in about 3 months (around 10/8/2022) for Annual physical.  Patient was given instructions and counseling regarding his condition or for health maintenance advice. Please see specific information pulled into the AVS if appropriate.       Answers for HPI/ROS submitted by the patient on 7/7/2022  What is the primary reason for your visit?: Physical

## 2022-08-01 ENCOUNTER — APPOINTMENT (OUTPATIENT)
Dept: LAB | Facility: HOSPITAL | Age: 67
End: 2022-08-01

## 2022-09-21 ENCOUNTER — TELEPHONE (OUTPATIENT)
Dept: FAMILY MEDICINE CLINIC | Facility: CLINIC | Age: 67
End: 2022-09-21

## 2022-09-28 ENCOUNTER — LAB (OUTPATIENT)
Dept: LAB | Facility: HOSPITAL | Age: 67
End: 2022-09-28

## 2022-09-28 ENCOUNTER — OFFICE VISIT (OUTPATIENT)
Dept: FAMILY MEDICINE CLINIC | Facility: CLINIC | Age: 67
End: 2022-09-28

## 2022-09-28 VITALS
DIASTOLIC BLOOD PRESSURE: 87 MMHG | WEIGHT: 199.4 LBS | HEART RATE: 64 BPM | BODY MASS INDEX: 27.01 KG/M2 | RESPIRATION RATE: 14 BRPM | SYSTOLIC BLOOD PRESSURE: 147 MMHG | OXYGEN SATURATION: 100 % | TEMPERATURE: 97.9 F | HEIGHT: 72 IN

## 2022-09-28 DIAGNOSIS — Z12.5 SCREENING PSA (PROSTATE SPECIFIC ANTIGEN): ICD-10-CM

## 2022-09-28 DIAGNOSIS — Z00.00 MEDICARE ANNUAL WELLNESS VISIT, SUBSEQUENT: ICD-10-CM

## 2022-09-28 DIAGNOSIS — K21.00 GASTROESOPHAGEAL REFLUX DISEASE WITH ESOPHAGITIS WITHOUT HEMORRHAGE: ICD-10-CM

## 2022-09-28 DIAGNOSIS — Z00.00 MEDICARE ANNUAL WELLNESS VISIT, SUBSEQUENT: Primary | ICD-10-CM

## 2022-09-28 DIAGNOSIS — G50.0 TRIGEMINAL NEURALGIA OF RIGHT SIDE OF FACE: ICD-10-CM

## 2022-09-28 DIAGNOSIS — E55.9 VITAMIN D DEFICIENCY: ICD-10-CM

## 2022-09-28 DIAGNOSIS — N52.9 ERECTILE DYSFUNCTION, UNSPECIFIED ERECTILE DYSFUNCTION TYPE: ICD-10-CM

## 2022-09-28 DIAGNOSIS — K90.41 GLUTEN INTOLERANCE: ICD-10-CM

## 2022-09-28 LAB
25(OH)D3 SERPL-MCNC: 45.4 NG/ML (ref 30–100)
ALBUMIN SERPL-MCNC: 4.4 G/DL (ref 3.5–5)
ALBUMIN/GLOB SERPL: 1.7 G/DL (ref 1.1–2.5)
ALP SERPL-CCNC: 71 U/L (ref 24–120)
ALT SERPL W P-5'-P-CCNC: 27 U/L (ref 0–50)
ANION GAP SERPL CALCULATED.3IONS-SCNC: 7 MMOL/L (ref 4–13)
AST SERPL-CCNC: 34 U/L (ref 7–45)
AUTO MIXED CELLS #: 0.5 10*3/MM3 (ref 0.1–2.6)
AUTO MIXED CELLS %: 9.1 % (ref 0.1–24)
BILIRUB SERPL-MCNC: 0.8 MG/DL (ref 0.1–1)
BILIRUB UR QL STRIP: NEGATIVE
BUN SERPL-MCNC: 13 MG/DL (ref 5–21)
BUN/CREAT SERPL: 12.5
CALCIUM SPEC-SCNC: 9.5 MG/DL (ref 8.4–10.4)
CHLORIDE SERPL-SCNC: 109 MMOL/L (ref 98–110)
CHOLEST SERPL-MCNC: 219 MG/DL (ref 130–200)
CLARITY UR: CLEAR
CO2 SERPL-SCNC: 28 MMOL/L (ref 24–31)
COLOR UR: YELLOW
CREAT SERPL-MCNC: 1.04 MG/DL (ref 0.5–1.4)
EGFRCR SERPLBLD CKD-EPI 2021: 78.2 ML/MIN/1.73
ERYTHROCYTE [DISTWIDTH] IN BLOOD BY AUTOMATED COUNT: 12.9 % (ref 12.3–15.4)
GLOBULIN UR ELPH-MCNC: 2.6 GM/DL
GLUCOSE SERPL-MCNC: 92 MG/DL (ref 70–100)
GLUCOSE UR STRIP-MCNC: NEGATIVE MG/DL
HCT VFR BLD AUTO: 43.9 % (ref 37.5–51)
HDLC SERPL-MCNC: 67 MG/DL
HGB BLD-MCNC: 15 G/DL (ref 13–17.7)
HGB UR QL STRIP.AUTO: NEGATIVE
KETONES UR QL STRIP: NEGATIVE
LDLC SERPL CALC-MCNC: 129 MG/DL (ref 0–99)
LDLC/HDLC SERPL: 1.87 {RATIO}
LEUKOCYTE ESTERASE UR QL STRIP.AUTO: NEGATIVE
LYMPHOCYTES # BLD AUTO: 1.4 10*3/MM3 (ref 0.7–3.1)
LYMPHOCYTES NFR BLD AUTO: 23.7 % (ref 19.6–45.3)
MCH RBC QN AUTO: 30.3 PG (ref 26.6–33)
MCHC RBC AUTO-ENTMCNC: 34.2 G/DL (ref 31.5–35.7)
MCV RBC AUTO: 88.7 FL (ref 79–97)
NEUTROPHILS NFR BLD AUTO: 3.9 10*3/MM3 (ref 1.7–7)
NEUTROPHILS NFR BLD AUTO: 67.2 % (ref 42.7–76)
NITRITE UR QL STRIP: NEGATIVE
PH UR STRIP.AUTO: 6 [PH] (ref 5–8)
PLATELET # BLD AUTO: 233 10*3/MM3 (ref 140–450)
PMV BLD AUTO: 9.2 FL (ref 6–12)
POTASSIUM SERPL-SCNC: 5 MMOL/L (ref 3.5–5.3)
PROT SERPL-MCNC: 7 G/DL (ref 6.3–8.7)
PROT UR QL STRIP: NEGATIVE
PSA SERPL-MCNC: 1.71 NG/ML (ref 0–4)
RBC # BLD AUTO: 4.95 10*6/MM3 (ref 4.14–5.8)
SODIUM SERPL-SCNC: 144 MMOL/L (ref 135–145)
SP GR UR STRIP: 1.01 (ref 1–1.03)
TRIGL SERPL-MCNC: 132 MG/DL (ref 0–149)
TSH SERPL DL<=0.05 MIU/L-ACNC: 2.52 UIU/ML (ref 0.27–4.2)
UROBILINOGEN UR QL STRIP: NORMAL
VLDLC SERPL-MCNC: 23 MG/DL (ref 5–40)
WBC NRBC COR # BLD: 5.8 10*3/MM3 (ref 3.4–10.8)

## 2022-09-28 PROCEDURE — 82306 VITAMIN D 25 HYDROXY: CPT

## 2022-09-28 PROCEDURE — G0103 PSA SCREENING: HCPCS

## 2022-09-28 PROCEDURE — 80053 COMPREHEN METABOLIC PANEL: CPT

## 2022-09-28 PROCEDURE — 1159F MED LIST DOCD IN RCRD: CPT | Performed by: NURSE PRACTITIONER

## 2022-09-28 PROCEDURE — 1126F AMNT PAIN NOTED NONE PRSNT: CPT | Performed by: NURSE PRACTITIONER

## 2022-09-28 PROCEDURE — 81003 URINALYSIS AUTO W/O SCOPE: CPT

## 2022-09-28 PROCEDURE — 1170F FXNL STATUS ASSESSED: CPT | Performed by: NURSE PRACTITIONER

## 2022-09-28 PROCEDURE — G0439 PPPS, SUBSEQ VISIT: HCPCS | Performed by: NURSE PRACTITIONER

## 2022-09-28 PROCEDURE — 36415 COLL VENOUS BLD VENIPUNCTURE: CPT

## 2022-09-28 PROCEDURE — 80061 LIPID PANEL: CPT

## 2022-09-28 PROCEDURE — 85025 COMPLETE CBC W/AUTO DIFF WBC: CPT

## 2022-09-28 PROCEDURE — 84443 ASSAY THYROID STIM HORMONE: CPT

## 2022-09-28 RX ORDER — GABAPENTIN 100 MG/1
100 CAPSULE ORAL 3 TIMES DAILY
COMMUNITY
End: 2022-09-28 | Stop reason: SDUPTHER

## 2022-09-28 RX ORDER — SILDENAFIL 100 MG/1
100 TABLET, FILM COATED ORAL DAILY PRN
Qty: 30 TABLET | Refills: 2 | Status: SHIPPED | OUTPATIENT
Start: 2022-09-28 | End: 2022-11-08 | Stop reason: SDUPTHER

## 2022-09-28 RX ORDER — ESOMEPRAZOLE MAGNESIUM 40 MG/1
40 CAPSULE, DELAYED RELEASE ORAL
Qty: 90 CAPSULE | Refills: 3 | Status: SHIPPED | OUTPATIENT
Start: 2022-09-28

## 2022-09-28 RX ORDER — GABAPENTIN 100 MG/1
100 CAPSULE ORAL 3 TIMES DAILY
Qty: 90 CAPSULE | Refills: 2 | Status: ON HOLD | OUTPATIENT
Start: 2022-09-28 | End: 2023-03-20

## 2022-09-28 NOTE — PROGRESS NOTES
"The ABCs of the Annual Wellness Visit  Subsequent Medicare Wellness Visit    Chief Complaint   Patient presents with   • Medicare Wellness-subsequent       Subjective   History of Present Illness:  Chao SILVA Jr. is a 67 y.o. male who presents for a Subsequent Medicare Wellness Visit.    HEALTH RISK ASSESSMENT    Recent Hospitalizations:  No hospitalization(s) within the last year.    Current Medical Providers:  Patient Care Team:  Griselda Burrell APRN as PCP - General (Nurse Practitioner)    Smoking Status:  Social History     Tobacco Use   Smoking Status Former Smoker   • Packs/day: 0.25   • Years: 18.00   • Pack years: 4.50   • Start date: 1974   • Quit date: 1992   • Years since quittin.6   Smokeless Tobacco Former User   • Types: Chew   Tobacco Comment    quit in the        Alcohol Consumption:  Social History     Substance and Sexual Activity   Alcohol Use Yes   • Alcohol/week: 2.0 standard drinks   • Types: 2 Cans of beer per week       Depression Screen:   PHQ-2/PHQ-9 Depression Screening 2022   Retired PHQ-9 Total Score -   Retired Total Score -   Little Interest or Pleasure in Doing Things 0-->not at all   Feeling Down, Depressed or Hopeless 0-->not at all   PHQ-9: Brief Depression Severity Measure Score 0       LANG: Over the last two weeks, how often have you been bothered by the following problems?  Feeling nervous, anxious or on edge: Not at all  Not being able to stop or control worrying: Not at all  Worrying too much about different things: Not at all  Trouble Relaxing: Not at all  Being so restless that it is hard to sit still: Not at all  Becoming easily annoyed or irritable: Not at all  Feeling afraid as if something awful might happen: Not at all  LANG 7 Total Score: 0    PHQ-2 Depression Screening  Little interest or pleasure in doing things? 0-->not at all   Feeling down, depressed, or hopeless? 0-->not at all   PHQ-2 Total Score 0      PHQ-9 Depression " Screening  Little interest or pleasure in doing things? 0-->not at all   Feeling down, depressed, or hopeless? 0-->not at all   Trouble falling or staying asleep, or sleeping too much?     Feeling tired or having little energy?     Poor appetite or overeating?     Feeling bad about yourself - or that you are a failure or have let yourself or your family down?     Trouble concentrating on things, such as reading the newspaper or watching television?     Moving or speaking so slowly that other people could have noticed? Or the opposite - being so fidgety or restless that you have been moving around a lot more than usual?     Thoughts that you would be better off dead, or of hurting yourself in some way?     PHQ-9 Total Score 0   If you checked off any problems, how difficult have these problems made it for you to do your work, take care of things at home, or get along with other people?          Fall Risk Screen:  LOYDA Fall Risk Assessment was completed, and patient is at LOW risk for falls.Assessment completed on:9/28/2022    Health Habits and Functional and Cognitive Screening:  Functional & Cognitive Status 9/28/2022   Do you have difficulty preparing food and eating? No   Do you have difficulty bathing yourself, getting dressed or grooming yourself? No   Do you have difficulty using the toilet? No   Do you have difficulty moving around from place to place? No   Do you have trouble with steps or getting out of a bed or a chair? No   Current Diet Well Balanced Diet   Dental Exam Up to date   Eye Exam Up to date   Exercise (times per week) 7 times per week   Current Exercises Include Stair Step Machine   Do you need help using the phone?  No   Are you deaf or do you have serious difficulty hearing?  No   Do you need help with transportation? No   Do you need help shopping? No   Do you need help preparing meals?  No   Do you need help with housework?  No   Do you need help with laundry? No   Do you need help taking  your medications? No   Do you need help managing money? No   Do you ever drive or ride in a car without wearing a seat belt? No   Have you felt unusual stress, anger or loneliness in the last month? No   Who do you live with? Spouse   If you need help, do you have trouble finding someone available to you? No   Have you been bothered in the last four weeks by sexual problems? No   Do you have difficulty concentrating, remembering or making decisions? No     BMI is >= 25 and <30. (Overweight) The following options were offered after discussion;: weight loss educational material (shared in after visit summary), exercise counseling/recommendations and nutrition counseling/recommendations        Does the patient have evidence of cognitive impairment? No    Asprin use counseling:Does not need ASA (and currently is not on it)    Age-appropriate Screening Schedule:  Refer to the list below for future screening recommendations based on patient's age, sex and/or medical conditions. Orders for these recommended tests are listed in the plan section. The patient has been provided with a written plan.    Health Maintenance   Topic Date Due   • ZOSTER VACCINE (2 of 2) 10/04/2021   • LIPID PANEL  08/09/2022   • INFLUENZA VACCINE  10/01/2022   • TDAP/TD VACCINES (2 - Td or Tdap) 03/14/2028          The following portions of the patient's history were reviewed and updated as appropriate: allergies, current medications, past family history, past medical history, past social history, past surgical history and problem list.    Outpatient Medications Prior to Visit   Medication Sig Dispense Refill   • dapsone 25 MG tablet Take 1 tablet by mouth As Needed (rash). 30 tablet 5   • dexlansoprazole (Dexilant) 60 MG capsule Take 1 capsule by mouth Daily for 90 days. 90 capsule 0   • gabapentin (NEURONTIN) 100 MG capsule Take 100 mg by mouth 3 (Three) Times a Day.     • sildenafil (Viagra) 100 MG tablet Take 1 tablet by mouth Daily As Needed  "for Erectile Dysfunction. 30 tablet 1     No facility-administered medications prior to visit.       Patient Active Problem List   Diagnosis   • Hx of adenomatous colonic polyps   • BRBPR (bright red blood per rectum)   • Bowel habit changes   • Nonsmoker   • Anal fissure   • Medicare annual wellness visit, subsequent   • Erectile dysfunction   • Gastroesophageal reflux disease without esophagitis   • Vitamin D deficiency   • Screening for malignant neoplasm of prostate   • Trigeminal neuralgia of right side of face   • Abdominal pain, epigastric   • Gastroesophageal reflux disease with esophagitis without hemorrhage   • Hiatal hernia with GERD   • Gluten intolerance       Advanced Care Planning:  ACP discussion was held with the patient during this visit. Patient has an advance directive in EMR which is still valid.     Review of Systems    Compared to one year ago, the patient feels his physical health is the same.  Compared to one year ago, the patient feels his mental health is the same.    Reviewed chart for potential of high risk medication in the elderly: yes  Reviewed chart for potential of harmful drug interactions in the elderly:yes    Objective         Vitals:    09/28/22 0732   BP: 147/87   Pulse: 64   Resp: 14   Temp: 97.9 °F (36.6 °C)   SpO2: 100%   Weight: 90.4 kg (199 lb 6.4 oz)   Height: 182.9 cm (72\")   PainSc: 0-No pain       Body mass index is 27.04 kg/m².  Discussed the patient's BMI with him. The BMI is above average; BMI management plan is completed.    Physical Exam  Vitals and nursing note reviewed.   Constitutional:       Appearance: Normal appearance. He is well-developed.   HENT:      Head: Normocephalic and atraumatic.      Right Ear: Tympanic membrane, ear canal and external ear normal.      Left Ear: Tympanic membrane, ear canal and external ear normal.      Nose: Nose normal. No septal deviation, nasal tenderness or congestion.      Mouth/Throat:      Lips: Pink. No lesions.      " Mouth: Mucous membranes are moist. No oral lesions.      Dentition: Normal dentition.      Pharynx: Oropharynx is clear. No pharyngeal swelling, oropharyngeal exudate or posterior oropharyngeal erythema.   Eyes:      General: Lids are normal. Vision grossly intact. No scleral icterus.        Right eye: No discharge.         Left eye: No discharge.      Extraocular Movements: Extraocular movements intact.      Conjunctiva/sclera: Conjunctivae normal.      Right eye: Right conjunctiva is not injected.      Left eye: Left conjunctiva is not injected.      Pupils: Pupils are equal, round, and reactive to light.   Neck:      Thyroid: No thyroid mass.      Trachea: Trachea normal.   Cardiovascular:      Rate and Rhythm: Normal rate and regular rhythm.      Heart sounds: Normal heart sounds. No murmur heard.    No gallop.   Pulmonary:      Effort: Pulmonary effort is normal.      Breath sounds: Normal breath sounds and air entry. No wheezing, rhonchi or rales.   Musculoskeletal:         General: No tenderness or deformity. Normal range of motion.      Cervical back: Full passive range of motion without pain, normal range of motion and neck supple.      Thoracic back: Normal.      Right lower leg: No edema.      Left lower leg: No edema.   Skin:     General: Skin is warm and dry.      Coloration: Skin is not jaundiced.      Findings: No rash.   Neurological:      Mental Status: He is alert and oriented to person, place, and time.      Cranial Nerves: Cranial nerves are intact.      Sensory: Sensation is intact.      Motor: Motor function is intact.      Coordination: Coordination is intact.      Gait: Gait is intact.      Deep Tendon Reflexes: Reflexes are normal and symmetric.   Psychiatric:         Mood and Affect: Mood and affect normal.         Behavior: Behavior normal.         Judgment: Judgment normal.               Assessment & Plan   Medicare Risks and Personalized Health Plan  CMS Preventative Services Quick  Reference  Advance Directive Discussion  Colon Cancer Screening  Obesity/Overweight   Prostate Cancer Screening     The above risks/problems have been discussed with the patient.  Pertinent information has been shared with the patient in the After Visit Summary.  Follow up plans and orders are seen below in the Assessment/Plan Section.    Problem List Items Addressed This Visit        Endocrine and Metabolic    Vitamin D deficiency    Relevant Orders    Vitamin D 25 Hydroxy       Gastrointestinal Abdominal     Gluten intolerance    Current Assessment & Plan     Takes dapsone as needed now for a gluten rash that was started by Dr Monge years ago.            Genitourinary and Reproductive     Erectile dysfunction    Relevant Medications    sildenafil (Viagra) 100 MG tablet       Health Encounters    Medicare annual wellness visit, subsequent - Primary    Relevant Orders    CBC Auto Differential    Comprehensive Metabolic Panel    Lipid Panel    PSA Screen    TSH    Urinalysis With Culture If Indicated - Urine, Clean Catch    Vitamin D 25 Hydroxy       Neuro    Trigeminal neuralgia of right side of face    Current Assessment & Plan     Has developed neuralgia due to wearing PPE all the time. Will take Neurontin as needed         Relevant Medications    gabapentin (NEURONTIN) 100 MG capsule       Other    Gastroesophageal reflux disease with esophagitis without hemorrhage    Current Assessment & Plan     Patient has been on dexilant for several years, but he states that it is expensive. Wants to try nexium at this time. If it does not work, he is to let us know and I will refill the dexilant.          Relevant Medications    esomeprazole (nexIUM) 40 MG capsule      Other Visit Diagnoses     Screening PSA (prostate specific antigen)        Relevant Orders    PSA Screen           Follow Up:  Return in about 1 year (around 9/28/2023) for Annual physical.     An After Visit Summary and PPPS were given to the patient.

## 2022-09-28 NOTE — ASSESSMENT & PLAN NOTE
Patient has been on dexilant for several years, but he states that it is expensive. Wants to try nexium at this time. If it does not work, he is to let us know and I will refill the dexilant.

## 2022-09-30 ENCOUNTER — TELEPHONE (OUTPATIENT)
Dept: FAMILY MEDICINE CLINIC | Facility: CLINIC | Age: 67
End: 2022-09-30

## 2022-09-30 NOTE — TELEPHONE ENCOUNTER
----- Message from KENNA Jackson sent at 9/30/2022  7:53 AM CDT -----  Labs wonderful   Detailed exam

## 2022-11-08 DIAGNOSIS — N52.9 ERECTILE DYSFUNCTION, UNSPECIFIED ERECTILE DYSFUNCTION TYPE: ICD-10-CM

## 2022-11-08 RX ORDER — SILDENAFIL 100 MG/1
100 TABLET, FILM COATED ORAL DAILY PRN
Qty: 30 TABLET | Refills: 2 | Status: SHIPPED | OUTPATIENT
Start: 2022-11-08

## 2022-11-08 NOTE — TELEPHONE ENCOUNTER
Caller: Chao SILVA Jr.    Relationship: Self    Best call back number: [phone]    Requested Prescriptions:   Requested Prescriptions     Pending Prescriptions Disp Refills   • sildenafil (Viagra) 100 MG tablet 30 tablet 2     Sig: Take 1 tablet by mouth Daily As Needed for Erectile Dysfunction.        Pharmacy where request should be sent: [facility] [address] [place] [address] - [address] AT [facility] - [phone] [facility] [phone] FX     Additional details provided by patient: COMPLETELY OUT    PATIENT STATES THIS PRESCRIPTION WAS SUPPOSED TO BE REFILLED AFTER APPOINTMENT IN SEPTEMBER, 2022. PATIENT STATES THAT [facility]'S PHARMACY DID NOT RECEIVE THE PRESCRIPTION, AND PATIENT IS NOW WONDERING IF THIS MEDICATION CAN BE SENT TO [facility] INSTEAD.     PLEASE CALL PATIENT IF MEDICATION IS CALLED IN.     Does the patient have less than a 3 day supply:  [x] Yes  [] No    Jaimie Gillis Rep   11/08/22 12:48 CST

## 2023-02-14 ENCOUNTER — OFFICE VISIT (OUTPATIENT)
Dept: FAMILY MEDICINE CLINIC | Facility: CLINIC | Age: 68
End: 2023-02-14
Payer: MEDICARE

## 2023-02-14 VITALS
TEMPERATURE: 97.5 F | DIASTOLIC BLOOD PRESSURE: 89 MMHG | HEIGHT: 72 IN | WEIGHT: 202.4 LBS | OXYGEN SATURATION: 99 % | BODY MASS INDEX: 27.41 KG/M2 | RESPIRATION RATE: 14 BRPM | HEART RATE: 63 BPM | SYSTOLIC BLOOD PRESSURE: 147 MMHG

## 2023-02-14 DIAGNOSIS — T78.40XD ALLERGY, SUBSEQUENT ENCOUNTER: ICD-10-CM

## 2023-02-14 DIAGNOSIS — J40 BRONCHITIS: Primary | ICD-10-CM

## 2023-02-14 PROCEDURE — 99213 OFFICE O/P EST LOW 20 MIN: CPT | Performed by: NURSE PRACTITIONER

## 2023-02-14 RX ORDER — AZITHROMYCIN 250 MG/1
TABLET, FILM COATED ORAL
Qty: 6 TABLET | Refills: 0 | Status: ON HOLD | OUTPATIENT
Start: 2023-02-14 | End: 2023-03-20

## 2023-02-14 RX ORDER — METHYLPREDNISOLONE 4 MG/1
TABLET ORAL
Qty: 1 EACH | Refills: 0 | Status: ON HOLD | OUTPATIENT
Start: 2023-02-14 | End: 2023-03-20

## 2023-02-14 RX ORDER — DEXLANSOPRAZOLE 60 MG/1
60 CAPSULE, DELAYED RELEASE ORAL AS NEEDED
COMMUNITY

## 2023-02-14 RX ORDER — FEXOFENADINE HCL 180 MG/1
180 TABLET ORAL DAILY
Qty: 90 TABLET | Refills: 3 | Status: SHIPPED | OUTPATIENT
Start: 2023-02-14

## 2023-02-14 RX ORDER — OLOPATADINE HYDROCHLORIDE 2 MG/ML
1 SOLUTION/ DROPS OPHTHALMIC DAILY
Qty: 5 EACH | Refills: 5 | Status: SHIPPED | OUTPATIENT
Start: 2023-02-14 | End: 2023-05-15

## 2023-02-14 RX ORDER — OLOPATADINE HYDROCHLORIDE 2 MG/ML
SOLUTION/ DROPS OPHTHALMIC DAILY
COMMUNITY
End: 2023-02-14 | Stop reason: SDUPTHER

## 2023-02-14 RX ORDER — FEXOFENADINE HCL 180 MG/1
180 TABLET ORAL DAILY
COMMUNITY
End: 2023-02-14 | Stop reason: SDUPTHER

## 2023-02-14 NOTE — PROGRESS NOTES
"Chief Complaint  Ear Fullness, Eyes itching, Nasal Congestion, and Larnyx hurting    Subjective    History of Present Illness      Patient presents to [facility] for   History of Present Illness  Patient complains of bilateral ear fullness, especially the left ear. He also complains of congestion, sore larynx and itchy eyes. He states this has been going on for about 3 weeks. He would like refills on his allergy eye drops and allegra today.        Review of Systems    I have reviewed and agree with the HPI information as above.  Griselda Burrell, APRN     Objective   Vital Signs:   /89   Pulse 63   Temp 97.5 °F (36.4 °C)   Resp 14   Ht 182.9 cm (72\")   Wt 91.8 kg (202 lb 6.4 oz)   SpO2 99%   BMI 27.45 kg/m²     BMI is >= 25 and <30. (Overweight) The following options were offered after discussion;: weight loss educational material (shared in after visit summary), exercise counseling/recommendations and nutrition counseling/recommendations      Physical Exam  Vitals and nursing note reviewed.   Constitutional:       Appearance: Normal appearance. He is well-developed.   HENT:      Head: Normocephalic and atraumatic.      Right Ear: Tympanic membrane, ear canal and external ear normal.      Left Ear: Tympanic membrane, ear canal and external ear normal.      Nose: Nose normal. No septal deviation, nasal tenderness or congestion.      Mouth/Throat:      Lips: Pink. No lesions.      Mouth: Mucous membranes are moist. No oral lesions.      Dentition: Normal dentition.      Pharynx: Oropharynx is clear. No pharyngeal swelling, oropharyngeal exudate or posterior oropharyngeal erythema.   Eyes:      General: Lids are normal. Vision grossly intact. No scleral icterus.        Right eye: No discharge.         Left eye: No discharge.      Extraocular Movements: Extraocular movements intact.      Conjunctiva/sclera: Conjunctivae normal.      Right eye: Right conjunctiva is not " injected.      Left eye: Left conjunctiva is not injected.      Pupils: Pupils are equal, round, and reactive to light.   Neck:      Thyroid: No thyroid mass.      Trachea: Trachea normal.   Cardiovascular:      Rate and Rhythm: Normal rate and regular rhythm.      Heart sounds: Normal heart sounds. No murmur heard.    No gallop.   Pulmonary:      Effort: Pulmonary effort is normal.      Breath sounds: Normal breath sounds and air entry. No wheezing, rhonchi or rales.   Musculoskeletal:         General: No tenderness or deformity. Normal range of motion.      Cervical back: Full passive range of motion without pain, normal range of motion and neck supple.      Thoracic back: Normal.      Right lower leg: No edema.      Left lower leg: No edema.   Skin:     General: Skin is warm and dry.      Coloration: Skin is not jaundiced.      Findings: No rash.   Neurological:      Mental Status: He is alert and oriented to person, place, and time.      Sensory: Sensation is intact.      Motor: Motor function is intact.      Coordination: Coordination is intact.      Gait: Gait is intact.      Deep Tendon Reflexes: Reflexes are normal and symmetric.   Psychiatric:         Mood and Affect: Mood and affect normal.         Behavior: Behavior normal.         Judgment: Judgment normal.          LANG-7:      PHQ-2 Depression Screening  Little interest or pleasure in doing things? 0-->not at all   Feeling down, depressed, or hopeless? 0-->not at all   PHQ-2 Total Score 0     PHQ-9 Depression Screening  Little interest or pleasure in doing things? 0-->not at all   Feeling down, depressed, or hopeless? 0-->not at all   Trouble falling or staying asleep, or sleeping too much?     Feeling tired or having little energy?     Poor appetite or overeating?     Feeling bad about yourself - or that you are a failure or have let yourself or your family down?     Trouble concentrating on things, such as reading the newspaper or watching television?      Moving or speaking so slowly that other people could have noticed? Or the opposite - being so fidgety or restless that you have been moving around a lot more than usual?     Thoughts that you would be better off dead, or of hurting yourself in some way?     PHQ-9 Total Score 0   If you checked off any problems, how difficult have these problems made it for you to do your work, take care of things at home, or get along with other people?        Result Review  Data Reviewed:                   Assessment and Plan      Diagnoses and all orders for this visit:    1. Bronchitis (Primary)  -     methylPREDNISolone (MEDROL) 4 MG dose pack; Take as directed on package instructions.  Dispense: 1 each; Refill: 0  -     azithromycin (Zithromax) 250 MG tablet; As directed  Dispense: 6 tablet; Refill: 0    2. Allergy, subsequent encounter  -     fexofenadine (ALLEGRA) 180 MG tablet; Take 1 tablet by mouth Daily.  Dispense: 90 tablet; Refill: 3  -     olopatadine (Pataday) 0.2 % solution ophthalmic solution; Administer 1 drop to both eyes Daily for 90 days.  Dispense: 5 each; Refill: 5    Patient states that he has been sick for several weeks with bronchitis. Denies fever. He states that he gets this a couple times a year. Treat as above.         Follow Up   Return if symptoms worsen or fail to improve.  Patient was given instructions and counseling regarding his condition or for health maintenance advice. Please see specific information pulled into the AVS if appropriate.       Answers for HPI/ROS submitted by the patient on 2/13/2023  What is the primary reason for your visit?: Other  Please describe your symptoms.: Bronchitis  Have you had these symptoms before?: Yes  How long have you been having these symptoms?: Greater than 2 weeks  Please list any medications you are currently taking for this condition.: None

## 2023-02-15 ENCOUNTER — OFFICE VISIT (OUTPATIENT)
Dept: GASTROENTEROLOGY | Facility: CLINIC | Age: 68
End: 2023-02-15
Payer: MEDICARE

## 2023-02-15 VITALS
BODY MASS INDEX: 27.41 KG/M2 | DIASTOLIC BLOOD PRESSURE: 100 MMHG | WEIGHT: 202.4 LBS | OXYGEN SATURATION: 99 % | HEART RATE: 76 BPM | SYSTOLIC BLOOD PRESSURE: 150 MMHG | HEIGHT: 72 IN | TEMPERATURE: 97.7 F

## 2023-02-15 DIAGNOSIS — Z78.9 NONSMOKER: ICD-10-CM

## 2023-02-15 DIAGNOSIS — K21.9 GASTROESOPHAGEAL REFLUX DISEASE, UNSPECIFIED WHETHER ESOPHAGITIS PRESENT: Primary | ICD-10-CM

## 2023-02-15 DIAGNOSIS — R13.19 ESOPHAGEAL DYSPHAGIA: ICD-10-CM

## 2023-02-15 DIAGNOSIS — R10.13 DYSPEPSIA: ICD-10-CM

## 2023-02-15 PROCEDURE — 99214 OFFICE O/P EST MOD 30 MIN: CPT | Performed by: CLINICAL NURSE SPECIALIST

## 2023-02-15 NOTE — PROGRESS NOTES
Chao Yadav Jr.  1955    2/15/2023  Chief Complaint   Patient presents with   • GI Problem     Endoscopy due to gerd     Subjective   HPI  Chao Yadav Jr. is a 67 y.o. male who presents with a complaint of reflux/indigestion burning persistent ongoing progressive for over the past year. He is also having some dysphagia with solid foods. Mid esophageal region. He says that this is mild to moderate. He says that Dexilant is helping him. No melena. No BRBPR. Bending over makes reflux worse.     Past Medical History:   Diagnosis Date   • Arthritis    • GERD (gastroesophageal reflux disease)    • History of transfusion    • Hx of colonic polyp    • Hypercholesteremia    • Shingles      Past Surgical History:   Procedure Laterality Date   • COLONOSCOPY N/A 03/25/2019    Tubular adenoma at 60 cm repeat exam in 5 years   • COLONOSCOPY W/ POLYPECTOMY  04/17/2014    Tubular adenoma at 60 cm repeat exam in 3 years   • ENDOSCOPY  04/17/2014    Small HH otherwise normal exam   • EYE SURGERY  2019    retina   • HAND SURGERY      July 2022   • KNEE SURGERY      x 2   • LUNG SURGERY      Due to complicated pneumonia   • ROTATOR CUFF REPAIR         Outpatient Medications Marked as Taking for the 2/15/23 encounter (Office Visit) with Esther Moore APRN   Medication Sig Dispense Refill   • azithromycin (Zithromax) 250 MG tablet As directed 6 tablet 0   • dapsone 25 MG tablet Take 1 tablet by mouth As Needed (rash). 30 tablet 5   • dexlansoprazole (DEXILANT) 60 MG capsule Take 60 mg by mouth As Needed. Stops nexium and takes dexilant as needed just for flare ups.     • esomeprazole (nexIUM) 40 MG capsule Take 1 capsule by mouth Every Morning Before Breakfast. 90 capsule 3   • fexofenadine (ALLEGRA) 180 MG tablet Take 1 tablet by mouth Daily. 90 tablet 3   • gabapentin (NEURONTIN) 100 MG capsule Take 1 capsule by mouth 3 (Three) Times a Day. 90 capsule 2   • methylPREDNISolone (MEDROL) 4 MG dose pack Take as  "directed on package instructions. 1 each 0   • olopatadine (Pataday) 0.2 % solution ophthalmic solution Administer 1 drop to both eyes Daily for 90 days. 5 each 5   • sildenafil (Viagra) 100 MG tablet Take 1 tablet by mouth Daily As Needed for Erectile Dysfunction. 30 tablet 2     Allergies   Allergen Reactions   • Gluten Meal Other (See Comments)     Skin rash dermitis   • Statins Other (See Comments)     Tried  2 different meds. Had muscle aches with each     Social History     Socioeconomic History   • Marital status:    Tobacco Use   • Smoking status: Former     Packs/day: 0.25     Years: 18.00     Pack years: 4.50     Types: Cigarettes     Start date: 1974     Quit date: 1992     Years since quittin.0   • Smokeless tobacco: Former     Types: Chew   • Tobacco comments:     quit in the    Vaping Use   • Vaping Use: Never used   Substance and Sexual Activity   • Alcohol use: Yes     Alcohol/week: 2.0 standard drinks     Types: 2 Cans of beer per week   • Drug use: Never   • Sexual activity: Defer     Family History   Problem Relation Age of Onset   • No Known Problems Mother    • No Known Problems Father    • Colon cancer Neg Hx    • Colon polyps Neg Hx    • Esophageal cancer Neg Hx      Health Maintenance   Topic Date Due   • ZOSTER VACCINE (2 of 2) 10/04/2021   • COVID-19 Vaccine (5 - Booster for Moderna series) 2021   • Pneumococcal Vaccine 65+ (1 - PCV) 2023 (Originally 2019)   • ANNUAL WELLNESS VISIT  2023   • LIPID PANEL  2023   • COLORECTAL CANCER SCREENING  2024   • TDAP/TD VACCINES (2 - Td or Tdap) 2028   • HEPATITIS C SCREENING  Completed   • INFLUENZA VACCINE  Completed   • AAA SCREEN (ONE-TIME)  Completed     Review of Systems   Constitutional: Negative for activity change, appetite change, chills, diaphoresis, fatigue, fever and unexpected weight change.   HENT: Positive for trouble swallowing. Negative for ear pain, hearing loss, " "mouth sores, sore throat and voice change.    Eyes: Negative.    Respiratory: Negative for cough, choking, shortness of breath and wheezing.    Cardiovascular: Negative for chest pain and palpitations.   Gastrointestinal: Negative for abdominal pain, blood in stool, constipation, diarrhea, nausea and vomiting.        Reflux   Endocrine: Negative for cold intolerance and heat intolerance.   Genitourinary: Negative for decreased urine volume, dysuria, frequency, hematuria and urgency.   Musculoskeletal: Negative for back pain, gait problem and myalgias.   Skin: Negative for color change, pallor and rash.   Allergic/Immunologic: Negative for food allergies and immunocompromised state.   Neurological: Negative for dizziness, tremors, seizures, syncope, weakness, light-headedness, numbness and headaches.   Hematological: Negative for adenopathy. Does not bruise/bleed easily.   Psychiatric/Behavioral: Negative for agitation and confusion. The patient is not nervous/anxious.    All other systems reviewed and are negative.    Objective   Vitals:    02/15/23 0955   BP: 150/100   Pulse: 76   Temp: 97.7 °F (36.5 °C)   SpO2: 99%   Weight: 91.8 kg (202 lb 6.4 oz)   Height: 182.9 cm (72.01\")     Body mass index is 27.44 kg/m².  Physical Exam  Constitutional:       Appearance: He is well-developed.   HENT:      Head: Normocephalic and atraumatic.   Eyes:      Pupils: Pupils are equal, round, and reactive to light.   Neck:      Trachea: No tracheal deviation.   Cardiovascular:      Rate and Rhythm: Normal rate and regular rhythm.      Heart sounds: Normal heart sounds. No murmur heard.    No friction rub. No gallop.   Pulmonary:      Effort: Pulmonary effort is normal. No respiratory distress.      Breath sounds: Normal breath sounds. No wheezing or rales.   Chest:      Chest wall: No tenderness.   Abdominal:      General: Bowel sounds are normal. There is no distension.      Palpations: Abdomen is soft. Abdomen is not rigid.      " Tenderness: There is no abdominal tenderness. There is no guarding or rebound.   Musculoskeletal:         General: No tenderness or deformity. Normal range of motion.      Cervical back: Normal range of motion and neck supple.   Skin:     General: Skin is warm and dry.      Coloration: Skin is not pale.      Findings: No rash.   Neurological:      Mental Status: He is alert and oriented to person, place, and time.      Deep Tendon Reflexes: Reflexes are normal and symmetric.   Psychiatric:         Behavior: Behavior normal.         Thought Content: Thought content normal.         Judgment: Judgment normal.       Assessment & Plan   Diagnoses and all orders for this visit:    1. Gastroesophageal reflux disease, unspecified whether esophagitis present (Primary)  -     Case Request; Standing  -     Follow Anesthesia Guidelines / Protocol; Future  -     Obtain Informed Consent; Future  -     Implement Anesthesia Orders Day of Procedure; Standing  -     Obtain Informed Consent; Standing  -     Case Request    2. Dyspepsia    3. Nonsmoker    4. Esophageal dysphagia    I discussed non pharmaceutical treatment of gerd.  This includes gradually losing weight to achieve ideal body wt., elevation of the head of bed by 4-6 inches, nothing to eat or drink 3 hours prior to lying down, avoiding tight clothing, stress reduction, tobacco cessation, reduction of alcohol intake, and dietary restrictions (avoiding caffeine, coffee, fatty foods, mints, chocolate, spicy foods and tomato based sauces as much as possible).    Continue Dexilant    ESOPHAGOGASTRODUODENOSCOPY WITH ANESTHESIA (N/A)  Part of this note may be an electronic transcription/translation of spoken language to printed text using the Dragon Dictation System.  Body mass index is 27.44 kg/m².  No follow-ups on file.    BMI is >= 25 and <30. (Overweight) The following options were offered after discussion;: weight loss educational material (shared in after visit  summary)      All risks, benefits, alternatives, and indications of colonoscopy and/or Endoscopy procedure have been discussed with the patient. Risks to include perforation of the colon requiring possible surgery or colostomy, risk of bleeding from biopsies or removal of colon tissue, possibility of missing a colon polyp or cancer, or adverse drug reaction.  Benefits to include the diagnosis and management of disease of the colon and rectum. Alternatives to include barium enema, radiographic evaluation, lab testing or no intervention. Pt verbalizes understanding and agrees.     Esther Moore, APRN  2/15/2023  10:09 CST          If you smoke or use tobacco, 4 minutes reading provided  Steps to Quit Smoking  Smoking tobacco can be harmful to your health and can affect almost every organ in your body. Smoking puts you, and those around you, at risk for developing many serious chronic diseases. Quitting smoking is difficult, but it is one of the best things that you can do for your health. It is never too late to quit.  What are the benefits of quitting smoking?  When you quit smoking, you lower your risk of developing serious diseases and conditions, such as:  · Lung cancer or lung disease, such as COPD.  · Heart disease.  · Stroke.  · Heart attack.  · Infertility.  · Osteoporosis and bone fractures.  Additionally, symptoms such as coughing, wheezing, and shortness of breath may get better when you quit. You may also find that you get sick less often because your body is stronger at fighting off colds and infections. If you are pregnant, quitting smoking can help to reduce your chances of having a baby of low birth weight.  How do I get ready to quit?  When you decide to quit smoking, create a plan to make sure that you are successful. Before you quit:  · Pick a date to quit. Set a date within the next two weeks to give you time to prepare.  · Write down the reasons why you are quitting. Keep this list in places  where you will see it often, such as on your bathroom mirror or in your car or wallet.  · Identify the people, places, things, and activities that make you want to smoke (triggers) and avoid them. Make sure to take these actions:  ¨ Throw away all cigarettes at home, at work, and in your car.  ¨ Throw away smoking accessories, such as ashtrays and lighters.  ¨ Clean your car and make sure to empty the ashtray.  ¨ Clean your home, including curtains and carpets.  · Tell your family, friends, and coworkers that you are quitting. Support from your loved ones can make quitting easier.  · Talk with your health care provider about your options for quitting smoking.  · Find out what treatment options are covered by your health insurance.  What strategies can I use to quit smoking?  Talk with your healthcare provider about different strategies to quit smoking. Some strategies include:  · Quitting smoking altogether instead of gradually lessening how much you smoke over a period of time. Research shows that quitting “cold turkey” is more successful than gradually quitting.  · Attending in-person counseling to help you build problem-solving skills. You are more likely to have success in quitting if you attend several counseling sessions. Even short sessions of 10 minutes can be effective.  · Finding resources and support systems that can help you to quit smoking and remain smoke-free after you quit. These resources are most helpful when you use them often. They can include:  ¨ Online chats with a counselor.  ¨ Telephone quitlines.  ¨ Printed self-help materials.  ¨ Support groups or group counseling.  ¨ Text messaging programs.  ¨ Mobile phone applications.  · Taking medicines to help you quit smoking. (If you are pregnant or breastfeeding, talk with your health care provider first.) Some medicines contain nicotine and some do not. Both types of medicines help with cravings, but the medicines that include nicotine help to  relieve withdrawal symptoms. Your health care provider may recommend:  ¨ Nicotine patches, gum, or lozenges.  ¨ Nicotine inhalers or sprays.  ¨ Non-nicotine medicine that is taken by mouth.  Talk with your health care provider about combining strategies, such as taking medicines while you are also receiving in-person counseling. Using these two strategies together makes you more likely to succeed in quitting than if you used either strategy on its own.  If you are pregnant or breastfeeding, talk with your health care provider about finding counseling or other support strategies to quit smoking. Do not take medicine to help you quit smoking unless told to do so by your health care provider.  What things can I do to make it easier to quit?  Quitting smoking might feel overwhelming at first, but there is a lot that you can do to make it easier. Take these important actions:  · Reach out to your family and friends and ask that they support and encourage you during this time. Call telephone quitlines, reach out to support groups, or work with a counselor for support.  · Ask people who smoke to avoid smoking around you.  · Avoid places that trigger you to smoke, such as bars, parties, or smoke-break areas at work.  · Spend time around people who do not smoke.  · Lessen stress in your life, because stress can be a smoking trigger for some people. To lessen stress, try:  ¨ Exercising regularly.  ¨ Deep-breathing exercises.  ¨ Yoga.  ¨ Meditating.  ¨ Performing a body scan. This involves closing your eyes, scanning your body from head to toe, and noticing which parts of your body are particularly tense. Purposefully relax the muscles in those areas.  · Download or purchase mobile phone or tablet apps (applications) that can help you stick to your quit plan by providing reminders, tips, and encouragement. There are many free apps, such as QuitGuide from the CDC (Centers for Disease Control and Prevention). You can find other  support for quitting smoking (smoking cessation) through smokefree.gov and other websites.  How will I feel when I quit smoking?  Within the first 24 hours of quitting smoking, you may start to feel some withdrawal symptoms. These symptoms are usually most noticeable 2-3 days after quitting, but they usually do not last beyond 2-3 weeks. Changes or symptoms that you might experience include:  · Mood swings.  · Restlessness, anxiety, or irritation.  · Difficulty concentrating.  · Dizziness.  · Strong cravings for sugary foods in addition to nicotine.  · Mild weight gain.  · Constipation.  · Nausea.  · Coughing or a sore throat.  · Changes in how your medicines work in your body.  · A depressed mood.  · Difficulty sleeping (insomnia).  After the first 2-3 weeks of quitting, you may start to notice more positive results, such as:  · Improved sense of smell and taste.  · Decreased coughing and sore throat.  · Slower heart rate.  · Lower blood pressure.  · Clearer skin.  · The ability to breathe more easily.  · Fewer sick days.  Quitting smoking is very challenging for most people. Do not get discouraged if you are not successful the first time. Some people need to make many attempts to quit before they achieve long-term success. Do your best to stick to your quit plan, and talk with your health care provider if you have any questions or concerns.  This information is not intended to replace advice given to you by your health care provider. Make sure you discuss any questions you have with your health care provider.  Document Released: 12/12/2002 Document Revised: 08/15/2017 Document Reviewed: 05/03/2016  [employer] Interactive Patient Education © 2017 "[employer]

## 2023-03-20 ENCOUNTER — ANESTHESIA (OUTPATIENT)
Dept: GASTROENTEROLOGY | Facility: HOSPITAL | Age: 68
End: 2023-03-20
Payer: MEDICARE

## 2023-03-20 ENCOUNTER — HOSPITAL ENCOUNTER (OUTPATIENT)
Facility: HOSPITAL | Age: 68
Setting detail: HOSPITAL OUTPATIENT SURGERY
Discharge: HOME | End: 2023-03-20
Attending: INTERNAL MEDICINE | Admitting: INTERNAL MEDICINE
Payer: MEDICARE

## 2023-03-20 ENCOUNTER — ANESTHESIA EVENT (OUTPATIENT)
Dept: GASTROENTEROLOGY | Facility: HOSPITAL | Age: 68
End: 2023-03-20
Payer: MEDICARE

## 2023-03-20 VITALS
WEIGHT: 200 LBS | OXYGEN SATURATION: 98 % | TEMPERATURE: 96.5 F | SYSTOLIC BLOOD PRESSURE: 138 MMHG | BODY MASS INDEX: 27.09 KG/M2 | HEIGHT: 72 IN | DIASTOLIC BLOOD PRESSURE: 89 MMHG | RESPIRATION RATE: 20 BRPM | HEART RATE: 59 BPM

## 2023-03-20 DIAGNOSIS — K21.9 GASTROESOPHAGEAL REFLUX DISEASE, UNSPECIFIED WHETHER ESOPHAGITIS PRESENT: ICD-10-CM

## 2023-03-20 PROCEDURE — 25010000002 PROPOFOL 10 MG/ML EMULSION: Performed by: NURSE ANESTHETIST, CERTIFIED REGISTERED

## 2023-03-20 PROCEDURE — 87081 CULTURE SCREEN ONLY: CPT | Performed by: INTERNAL MEDICINE

## 2023-03-20 PROCEDURE — 88305 TISSUE EXAM BY PATHOLOGIST: CPT | Performed by: INTERNAL MEDICINE

## 2023-03-20 PROCEDURE — 43239 EGD BIOPSY SINGLE/MULTIPLE: CPT | Performed by: INTERNAL MEDICINE

## 2023-03-20 RX ORDER — PROPOFOL 10 MG/ML
VIAL (ML) INTRAVENOUS AS NEEDED
Status: DISCONTINUED | OUTPATIENT
Start: 2023-03-20 | End: 2023-03-20 | Stop reason: SURG

## 2023-03-20 RX ORDER — SODIUM CHLORIDE 0.9 % (FLUSH) 0.9 %
10 SYRINGE (ML) INJECTION AS NEEDED
Status: DISCONTINUED | OUTPATIENT
Start: 2023-03-20 | End: 2023-03-20 | Stop reason: HOSPADM

## 2023-03-20 RX ORDER — SODIUM CHLORIDE 9 MG/ML
40 INJECTION, SOLUTION INTRAVENOUS AS NEEDED
Status: DISCONTINUED | OUTPATIENT
Start: 2023-03-20 | End: 2023-03-20 | Stop reason: HOSPADM

## 2023-03-20 RX ORDER — SODIUM CHLORIDE 9 MG/ML
100 INJECTION, SOLUTION INTRAVENOUS CONTINUOUS
Status: DISCONTINUED | OUTPATIENT
Start: 2023-03-20 | End: 2023-03-20 | Stop reason: HOSPADM

## 2023-03-20 RX ORDER — LIDOCAINE HYDROCHLORIDE 20 MG/ML
INJECTION, SOLUTION EPIDURAL; INFILTRATION; INTRACAUDAL; PERINEURAL AS NEEDED
Status: DISCONTINUED | OUTPATIENT
Start: 2023-03-20 | End: 2023-03-20 | Stop reason: SURG

## 2023-03-20 RX ORDER — SODIUM CHLORIDE 0.9 % (FLUSH) 0.9 %
10 SYRINGE (ML) INJECTION EVERY 12 HOURS SCHEDULED
Status: DISCONTINUED | OUTPATIENT
Start: 2023-03-20 | End: 2023-03-20 | Stop reason: HOSPADM

## 2023-03-20 RX ADMIN — SODIUM CHLORIDE 100 ML/HR: 9 INJECTION, SOLUTION INTRAVENOUS at 09:07

## 2023-03-20 RX ADMIN — PROPOFOL INJECTABLE EMULSION 200 MG: 10 INJECTION, EMULSION INTRAVENOUS at 11:03

## 2023-03-20 RX ADMIN — GLYCOPYRROLATE 0.1 MG: 0.2 INJECTION INTRAMUSCULAR; INTRAVENOUS at 11:01

## 2023-03-20 RX ADMIN — LIDOCAINE HYDROCHLORIDE 180 MG: 20 INJECTION, SOLUTION EPIDURAL; INFILTRATION; INTRACAUDAL; PERINEURAL at 11:03

## 2023-03-20 NOTE — H&P
[facility]  Pre Procedure History & Physical    Chief Complaint:   Acid reflux    Subjective     HPI:   Here for endoscopy.  Acid reflux.    Past Medical History:   Past Medical History:   Diagnosis Date   • Arthritis    • GERD (gastroesophageal reflux disease)    • History of transfusion    • Hx of colonic polyp    • Hypercholesteremia    • Shingles        Past Surgical History:  Past Surgical History:   Procedure Laterality Date   • COLONOSCOPY N/A 03/25/2019    Tubular adenoma at 60 cm repeat exam in 5 years   • COLONOSCOPY W/ POLYPECTOMY  04/17/2014    Tubular adenoma at 60 cm repeat exam in 3 years   • ENDOSCOPY  04/17/2014    Small HH otherwise normal exam   • EYE SURGERY  2019    retina   • HAND SURGERY      July 2022   • KNEE SURGERY      x 2   • LUNG SURGERY      Due to complicated pneumonia   • ROTATOR CUFF REPAIR         Family History:  Family History   Problem Relation Age of Onset   • No Known Problems Mother    • No Known Problems Father    • Colon cancer Neg Hx    • Colon polyps Neg Hx    • Esophageal cancer Neg Hx        Social History:   reports that he quit smoking about 31 years ago. His smoking use included cigarettes. He started smoking about 49 years ago. He has a 4.50 pack-year smoking history. He has quit using smokeless tobacco.  His smokeless tobacco use included chew. He reports current alcohol use of about 2.0 standard drinks per week. He reports that he does not use drugs.    Medications:   Prior to Admission medications    Medication Sig Start Date End Date Taking? Authorizing Provider   dapsone 25 MG tablet Take 1 tablet by mouth As Needed (rash). 7/8/22  Yes Griselda Burrell APRN   dexlansoprazole (DEXILANT) 60 MG capsule Take 1 capsule by mouth As Needed. Stops nexium and takes dexilant as needed just for flare ups.   Yes Provider, MD Mavercik   esomeprazole (nexIUM) 40 MG capsule Take 1 capsule by mouth Every Morning Before Breakfast. 9/28/22  Yes Johnnie  "Griselda Hardwick APRN   fexofenadine (ALLEGRA) 180 MG tablet Take 1 tablet by mouth Daily. 2/14/23  Yes Griselda Burrell APRN   olopatadine (Pataday) 0.2 % solution ophthalmic solution Administer 1 drop to both eyes Daily for 90 days. 2/14/23 5/15/23 Yes Griselda Burrell APRN   sildenafil (Viagra) 100 MG tablet Take 1 tablet by mouth Daily As Needed for Erectile Dysfunction. 11/8/22   Griselda Burrell APRN   azithromycin (Zithromax) 250 MG tablet As directed 2/14/23 3/20/23  Griselda Burrell APRN   gabapentin (NEURONTIN) 100 MG capsule Take 1 capsule by mouth 3 (Three) Times a Day. 9/28/22 3/20/23  Griselda Burrell APRN   methylPREDNISolone (MEDROL) 4 MG dose pack Take as directed on package instructions. 2/14/23 3/20/23  Griselda Burrell APRN       Allergies:  Gluten meal and Statins    Objective     Blood pressure (!) 164/101, pulse 68, temperature 96.5 °F (35.8 °C), temperature source Temporal, resp. rate 18, height 182.9 cm (72\"), weight 90.7 kg (200 lb), SpO2 100 %.    Physical Exam   Constitutional: Pt is oriented to person, place, and in no distress.   HENT: Mouth/Throat: Oropharynx is clear.   Cardiovascular: Normal rate, regular rhythm.    Pulmonary/Chest: Effort normal. No respiratory distress. No  wheezes.   Abdominal: Soft. Non-distended.  Skin: Skin is warm and dry.   Psychiatric: Mood, memory, affect and judgment appear normal.     Assessment & Plan     Diagnosis:  Acid reflux    Anticipated Surgical Procedure:    Proceed with endoscopy    The following major R/B/A were discussed with the patient, however the list is not all inclusive . Risk:  Bleeding (immediate and delayed), perforation (rupture or tear), reaction to medication, missed lesion/cancer, pain during the procedure, infection, need for surgery, need for ostomy, need for mechanical ventilation (breathing machine), death.  Benefits: removal of polyp/tissue, burn/clip/or inject to stop bleeding, removal " of foreign body, dilate any stricture.  Alternatives: Xray or CT, surgery, do nothing with associated risk   The patient was given time to ask question and received explanation, and agrees to proceed as per History and Physical.   No guarantee given or expressed.    EMR Dragon/transcription disclaimer: Much of this encounter note is an electronic transcription/translation of spoken language to printed text.  The electronic translation of spoken language may permit erroneous, or at times, nonsensical words or phrases to be inadvertently transcribed.  Although I have reviewed the note for such errors, some may still exist.    Kota Silvestre MD  11:03 CDT  3/20/2023

## 2023-03-20 NOTE — ANESTHESIA POSTPROCEDURE EVALUATION
"Patient: Chao Yadav Jr.    Procedure Summary     Date: 03/20/23 Room / Location: [facility] ENDOSCOPY 2 /  [facility]    Anesthesia Start: 1101 Anesthesia Stop: 1112    Procedure: ESOPHAGOGASTRODUODENOSCOPY WITH ANESTHESIA Diagnosis:       Gastroesophageal reflux disease, unspecified whether esophagitis present      (Gastroesophageal reflux disease, unspecified whether esophagitis present [K21.9])    Surgeons: Kota Silvestre MD Provider: Sage Allen CRNA    Anesthesia Type: MAC ASA Status: 2          Anesthesia Type: MAC    Vitals  Vitals Value Taken Time   BP     Temp     Pulse 76 03/20/23 1112   Resp     SpO2 96 % 03/20/23 1112   Vitals shown include unvalidated device data.        Post Anesthesia Care and Evaluation    Patient location during evaluation: PHASE II  Patient participation: complete - patient participated  Level of consciousness: awake and alert  Pain score: 0  Pain management: adequate    Airway patency: patent  Anesthetic complications: No anesthetic complications  PONV Status: none  Cardiovascular status: acceptable  Respiratory status: acceptable  Hydration status: acceptable    Comments: Blood pressure (!) 164/101, pulse 68, temperature 96.5 °F (35.8 °C), temperature source Temporal, resp. rate 18, height 182.9 cm (72\"), weight 90.7 kg (200 lb), SpO2 100 %.    Pt discharged from PACU based on betty score >8      "

## 2023-03-20 NOTE — ANESTHESIA PREPROCEDURE EVALUATION
Anesthesia Evaluation     Patient summary reviewed   no history of anesthetic complications:  NPO Solid Status: > 8 hours             Airway   Mallampati: I  TM distance: >3 FB  Neck ROM: full  Dental    (+) partials    Pulmonary    (+) a smoker Former,   Cardiovascular - negative cardio ROS  Exercise tolerance: excellent (>7 METS)        Neuro/Psych- negative ROS  GI/Hepatic/Renal/Endo    (+)  hiatal hernia, GERD, GI bleeding ,     Musculoskeletal     Abdominal    Substance History      OB/GYN          Other                          Anesthesia Plan    ASA 2     MAC     intravenous induction     Anesthetic plan, risks, benefits, and alternatives have been provided, discussed and informed consent has been obtained with: patient.

## 2023-03-21 LAB
LAB AP CASE REPORT: NORMAL
Lab: NORMAL
PATH REPORT.FINAL DX SPEC: NORMAL
PATH REPORT.GROSS SPEC: NORMAL
UREASE TISS QL: NEGATIVE

## 2023-03-23 ENCOUNTER — OFFICE VISIT (OUTPATIENT)
Dept: FAMILY MEDICINE CLINIC | Facility: CLINIC | Age: 68
End: 2023-03-23
Payer: MEDICARE

## 2023-03-23 VITALS
BODY MASS INDEX: 27.5 KG/M2 | SYSTOLIC BLOOD PRESSURE: 129 MMHG | DIASTOLIC BLOOD PRESSURE: 88 MMHG | OXYGEN SATURATION: 99 % | WEIGHT: 203 LBS | HEART RATE: 73 BPM | HEIGHT: 72 IN

## 2023-03-23 DIAGNOSIS — L91.8 SKIN TAG: ICD-10-CM

## 2023-03-23 DIAGNOSIS — L57.0 ACTINIC KERATOSIS DUE TO EXPOSURE TO SUNLIGHT: Primary | ICD-10-CM

## 2023-03-23 PROCEDURE — 1159F MED LIST DOCD IN RCRD: CPT | Performed by: PEDIATRICS

## 2023-03-23 PROCEDURE — 1160F RVW MEDS BY RX/DR IN RCRD: CPT | Performed by: PEDIATRICS

## 2023-03-23 PROCEDURE — 99213 OFFICE O/P EST LOW 20 MIN: CPT | Performed by: PEDIATRICS

## 2023-03-23 NOTE — ASSESSMENT & PLAN NOTE
Liquid nitrogen treatment will be tried if home treatment with topical acid (compound w) ineffective

## 2023-03-23 NOTE — PROGRESS NOTES
"Chief Complaint  Suspicious Skin Lesion (Mole on middle left of back.)    Subjective    History of Present Illness      Patient presents to [facility] for   History of Present Illness  Pt is here today with c/o mole on the middle left of his back that has changed in appearance over the last year.        Review of Systems    I have reviewed and agree with the HPI information as above.  Juanito Biggs MD     Objective   Vital Signs:   /88   Pulse 73   Ht 182.9 cm (72\")   Wt 92.1 kg (203 lb)   SpO2 99%   BMI 27.53 kg/m²     BMI is >= 25 and <30. (Overweight) The following options were offered after discussion;: nutrition counseling/recommendations      Physical Exam  Constitutional:       Appearance: Normal appearance. He is normal weight.   Cardiovascular:      Rate and Rhythm: Normal rate and regular rhythm.      Heart sounds: Normal heart sounds.   Pulmonary:      Effort: Pulmonary effort is normal.      Breath sounds: Normal breath sounds.   Skin:            Comments: Actinic keratosis with almost scrapable top       Skin tag    Neurological:      Mental Status: He is alert.   Psychiatric:         Mood and Affect: Mood normal.         Behavior: Behavior normal.          LANG-7:      PHQ-2 Depression Screening  Little interest or pleasure in doing things?     Feeling down, depressed, or hopeless?     PHQ-2 Total Score       PHQ-9 Depression Screening  Little interest or pleasure in doing things?     Feeling down, depressed, or hopeless?     Trouble falling or staying asleep, or sleeping too much?     Feeling tired or having little energy?     Poor appetite or overeating?     Feeling bad about yourself - or that you are a failure or have let yourself or your family down?     Trouble concentrating on things, such as reading the newspaper or watching television?     Moving or speaking so slowly that other people could have noticed? Or the opposite - being so fidgety or restless " that you have been moving around a lot more than usual?     Thoughts that you would be better off dead, or of hurting yourself in some way?     PHQ-9 Total Score     If you checked off any problems, how difficult have these problems made it for you to do your work, take care of things at home, or get along with other people?        Result Review  Data Reviewed:                   Assessment and Plan      Diagnoses and all orders for this visit:    1. Actinic keratosis due to exposure to sunlight (Primary)    2. Skin tag            Follow Up   Return if symptoms worsen or fail to improve.  Patient was given instructions and counseling regarding his condition or for health maintenance advice. Please see specific information pulled into the AVS if appropriate.       Answers for HPI/ROS submitted by the patient on 3/22/2023  What is the primary reason for your visit?: Other  Please describe your symptoms.: discolored mole  Have you had these symptoms before?: No  How long have you been having these symptoms?: Greater than 2 weeks  Please list any medications you are currently taking for this condition.: none  Please describe any probable cause for these symptoms. : unknown       History of lobectomy of lung  right upper  S/P colonoscopy    S/P drug eluting coronary stent placement    S/p nephrectomy    S/P shoulder surgery  right

## 2023-04-28 ENCOUNTER — LAB (OUTPATIENT)
Dept: LAB | Facility: HOSPITAL | Age: 68
End: 2023-04-28
Payer: MEDICARE

## 2023-04-28 ENCOUNTER — TELEPHONE (OUTPATIENT)
Dept: FAMILY MEDICINE CLINIC | Facility: CLINIC | Age: 68
End: 2023-04-28
Payer: MEDICARE

## 2023-04-28 ENCOUNTER — OFFICE VISIT (OUTPATIENT)
Dept: FAMILY MEDICINE CLINIC | Facility: CLINIC | Age: 68
End: 2023-04-28
Payer: MEDICARE

## 2023-04-28 VITALS
HEIGHT: 72 IN | OXYGEN SATURATION: 100 % | BODY MASS INDEX: 27.22 KG/M2 | DIASTOLIC BLOOD PRESSURE: 92 MMHG | SYSTOLIC BLOOD PRESSURE: 156 MMHG | HEART RATE: 84 BPM | WEIGHT: 201 LBS

## 2023-04-28 DIAGNOSIS — Z51.81 MEDICATION MONITORING ENCOUNTER: ICD-10-CM

## 2023-04-28 DIAGNOSIS — L13.0 DERMATITIS HERPETIFORMIS: Primary | ICD-10-CM

## 2023-04-28 DIAGNOSIS — L13.0 DERMATITIS HERPETIFORMIS: ICD-10-CM

## 2023-04-28 DIAGNOSIS — K90.41 GLUTEN INTOLERANCE: ICD-10-CM

## 2023-04-28 DIAGNOSIS — L57.0 ACTINIC KERATOSIS DUE TO EXPOSURE TO SUNLIGHT: ICD-10-CM

## 2023-04-28 DIAGNOSIS — R21 RASH: ICD-10-CM

## 2023-04-28 LAB
ALBUMIN SERPL-MCNC: 4.6 G/DL (ref 3.5–5)
ALBUMIN/GLOB SERPL: 1.8 G/DL (ref 1.1–2.5)
ALP SERPL-CCNC: 83 U/L (ref 24–120)
ALT SERPL W P-5'-P-CCNC: 27 U/L (ref 0–50)
ANION GAP SERPL CALCULATED.3IONS-SCNC: 2 MMOL/L (ref 4–13)
AST SERPL-CCNC: 31 U/L (ref 7–45)
AUTO MIXED CELLS #: 0.8 10*3/MM3 (ref 0.1–2.6)
AUTO MIXED CELLS %: 12.3 % (ref 0.1–24)
BILIRUB SERPL-MCNC: 1.1 MG/DL (ref 0.1–1)
BILIRUB UR QL STRIP: NEGATIVE
BUN SERPL-MCNC: 12 MG/DL (ref 5–21)
BUN/CREAT SERPL: 12.2
CALCIUM SPEC-SCNC: 9.4 MG/DL (ref 8.4–10.4)
CHLORIDE SERPL-SCNC: 107 MMOL/L (ref 98–110)
CLARITY UR: CLEAR
CO2 SERPL-SCNC: 29 MMOL/L (ref 24–31)
COLOR UR: YELLOW
CREAT SERPL-MCNC: 0.98 MG/DL (ref 0.5–1.4)
EGFRCR SERPLBLD CKD-EPI 2021: 84 ML/MIN/1.73
ERYTHROCYTE [DISTWIDTH] IN BLOOD BY AUTOMATED COUNT: 13.3 % (ref 12.3–15.4)
GLOBULIN UR ELPH-MCNC: 2.6 GM/DL
GLUCOSE SERPL-MCNC: 90 MG/DL (ref 70–100)
GLUCOSE UR STRIP-MCNC: NEGATIVE MG/DL
HCT VFR BLD AUTO: 40.8 % (ref 37.5–51)
HGB BLD-MCNC: 13.7 G/DL (ref 13–17.7)
HGB UR QL STRIP.AUTO: NEGATIVE
KETONES UR QL STRIP: NEGATIVE
LEUKOCYTE ESTERASE UR QL STRIP.AUTO: NEGATIVE
LYMPHOCYTES # BLD AUTO: 1.6 10*3/MM3 (ref 0.7–3.1)
LYMPHOCYTES NFR BLD AUTO: 23.9 % (ref 19.6–45.3)
MCH RBC QN AUTO: 30.9 PG (ref 26.6–33)
MCHC RBC AUTO-ENTMCNC: 33.6 G/DL (ref 31.5–35.7)
MCV RBC AUTO: 91.9 FL (ref 79–97)
NEUTROPHILS NFR BLD AUTO: 4.5 10*3/MM3 (ref 1.7–7)
NEUTROPHILS NFR BLD AUTO: 63.8 % (ref 42.7–76)
NITRITE UR QL STRIP: NEGATIVE
PH UR STRIP.AUTO: 6 [PH] (ref 5–8)
PLATELET # BLD AUTO: 233 10*3/MM3 (ref 140–450)
PMV BLD AUTO: 10 FL (ref 6–12)
POTASSIUM SERPL-SCNC: 3.8 MMOL/L (ref 3.5–5.3)
PROT SERPL-MCNC: 7.2 G/DL (ref 6.3–8.7)
PROT UR QL STRIP: NEGATIVE
RBC # BLD AUTO: 4.44 10*6/MM3 (ref 4.14–5.8)
SODIUM SERPL-SCNC: 138 MMOL/L (ref 135–145)
SP GR UR STRIP: 1.01 (ref 1–1.03)
UROBILINOGEN UR QL STRIP: NORMAL
WBC NRBC COR # BLD: 6.9 10*3/MM3 (ref 3.4–10.8)

## 2023-04-28 PROCEDURE — 85025 COMPLETE CBC W/AUTO DIFF WBC: CPT

## 2023-04-28 PROCEDURE — 81003 URINALYSIS AUTO W/O SCOPE: CPT

## 2023-04-28 PROCEDURE — 80053 COMPREHEN METABOLIC PANEL: CPT

## 2023-04-28 PROCEDURE — 36415 COLL VENOUS BLD VENIPUNCTURE: CPT

## 2023-04-28 RX ORDER — DAPSONE 100 MG/1
TABLET ORAL
Qty: 30 TABLET | Refills: 2 | Status: SHIPPED | OUTPATIENT
Start: 2023-04-28

## 2023-04-28 NOTE — PROGRESS NOTES
"Chief Complaint  Rash    Subjective    History of Present Illness      Patient presents to [facility] for   History of Present Illness  Patient is here today for rash all over body. States he needs a higher does of his Dapsone and requesting blood work. Patient also requesting referral to dermatology Dr. Reilly   Rash         Review of Systems   Skin: Positive for rash.   All other systems reviewed and are negative.      I have reviewed and agree with the HPI and ROS information as above.  Simran Resendiz, APRN     Objective   Vital Signs:   /92   Pulse 84   Ht 182.9 cm (72\")   Wt 91.2 kg (201 lb)   SpO2 100%   BMI 27.26 kg/m²     BMI is >= 25 and <30. (Overweight) The following options were offered after discussion;: exercise counseling/recommendations, nutrition counseling/recommendations and pharmacological intervention options      Physical Exam  Constitutional:       Appearance: Normal appearance. He is well-developed.   HENT:      Head: Normocephalic and atraumatic.      Right Ear: Tympanic membrane, ear canal and external ear normal.      Left Ear: Tympanic membrane, ear canal and external ear normal.      Nose: Nose normal. No septal deviation, nasal tenderness or congestion.      Mouth/Throat:      Lips: Pink. No lesions.      Mouth: Mucous membranes are moist. No oral lesions.      Dentition: Normal dentition.      Pharynx: Oropharynx is clear. No pharyngeal swelling, oropharyngeal exudate or posterior oropharyngeal erythema.   Eyes:      General: Lids are normal. Vision grossly intact. No scleral icterus.        Right eye: No discharge.         Left eye: No discharge.      Extraocular Movements: Extraocular movements intact.      Conjunctiva/sclera: Conjunctivae normal.      Right eye: Right conjunctiva is not injected.      Left eye: Left conjunctiva is not injected.      Pupils: Pupils are equal, round, and reactive to light.   Neck:      Thyroid: No thyroid mass. "      Trachea: Trachea normal.   Cardiovascular:      Rate and Rhythm: Normal rate and regular rhythm.      Heart sounds: Normal heart sounds. No murmur heard.    No gallop.   Pulmonary:      Effort: Pulmonary effort is normal.      Breath sounds: Normal breath sounds and air entry. No wheezing, rhonchi or rales.   Abdominal:      General: There is no distension.      Palpations: Abdomen is soft. There is no mass.      Tenderness: There is no abdominal tenderness. There is no right CVA tenderness, left CVA tenderness, guarding or rebound.   Musculoskeletal:         General: No tenderness or deformity. Normal range of motion.      Cervical back: Full passive range of motion without pain, normal range of motion and neck supple.      Thoracic back: Normal.      Right lower leg: No edema.      Left lower leg: No edema.   Skin:     General: Skin is warm and dry.      Coloration: Skin is not jaundiced.      Findings: Rash present. Rash is scaling.      Comments: To elbows, neck, back, legs and buttock    Neurological:      Mental Status: He is alert and oriented to person, place, and time.      Sensory: Sensation is intact.      Motor: Motor function is intact.      Coordination: Coordination is intact.      Gait: Gait is intact.      Deep Tendon Reflexes: Reflexes are normal and symmetric.   Psychiatric:         Mood and Affect: Mood and affect normal.         Judgment: Judgment normal.          LANG-7:      PHQ-2 Depression Screening  Little interest or pleasure in doing things? 0-->not at all   Feeling down, depressed, or hopeless? 0-->not at all   PHQ-2 Total Score 0     PHQ-9 Depression Screening  Little interest or pleasure in doing things? 0-->not at all   Feeling down, depressed, or hopeless? 0-->not at all   Trouble falling or staying asleep, or sleeping too much?     Feeling tired or having little energy?     Poor appetite or overeating?     Feeling bad about yourself - or that you are a failure or have let  yourself or your family down?     Trouble concentrating on things, such as reading the newspaper or watching television?     Moving or speaking so slowly that other people could have noticed? Or the opposite - being so fidgety or restless that you have been moving around a lot more than usual?     Thoughts that you would be better off dead, or of hurting yourself in some way?     PHQ-9 Total Score 0   If you checked off any problems, how difficult have these problems made it for you to do your work, take care of things at home, or get along with other people?        Result Review  Data Reviewed:                   Assessment and Plan      Diagnoses and all orders for this visit:    1. Dermatitis herpetiformis (Primary)  -     dapsone 100 MG tablet; Please take 1 tab PO daily  Dispense: 30 tablet; Refill: 2  -     Ambulatory Referral to Dermatology  -     CBC w AUTO Differential; Future  -     Comprehensive metabolic panel; Future  -     Urinalysis With Culture If Indicated -; Future    2. Rash  -     Ambulatory Referral to Dermatology    3. Gluten intolerance  -     dapsone 100 MG tablet; Please take 1 tab PO daily  Dispense: 30 tablet; Refill: 2  -     Ambulatory Referral to Dermatology    4. Actinic keratosis due to exposure to sunlight    5. Medication monitoring encounter  -     CBC w AUTO Differential; Future  -     Comprehensive metabolic panel; Future  -     Urinalysis With Culture If Indicated -; Future    Patient is seen today with complaints of a rash on multiple areas of his body.  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  Patient did have a biopsy as well which confirmed the diagnosis.  Patient was prescribed dapsone and put on a gluten-free diet.  He states that he went completely gluten-free for years and has done extremely well.  He states that he will be honest and in the last couple of months he has been lax.  He states the rash has  come back.  He states that he has had dapsone 25 mg and self started himself back on this.  He states he has worked himself up to 100 mg.  Patient does have his records from when he was diagnosed with this along with the starting treatment regimen that Dr. Monge prescribed.  I did have these records scanned into the chart.  He was worked up to 200 mg at that time but had to decrease this to 150 due to increased liver enzymes.  Is requesting to have his dapsone dose increased to 150.  He states that when he is on the correct regimen and dosing this usually gets rid of it completely.  Patient states he has started watching his gluten intake again and being more strict.  Patient is also requesting a referral to  For dermatology.  States the rash does itch and is on multiple areas of his body.  On exam today it is noted on his elbows, neck, lower extremities, and buttock.  He states that they do itch.  I did offer the patient a steroid but he refuses this at this time.  He states that usually it only required the dapsone.      Plan:  1.  Increase dapsone 150 mg.  100 mg tablets were sent in due to patient stating he has not of 25 mg.  2.  Referral to dermatology.  Dr. Reilly requested by patient.  3.  Continue strict gluten-free diet.  4. Medication monitoring labs ordered today.           Follow Up   No follow-ups on file.  Patient was given instructions and counseling regarding his condition or for health maintenance advice. Please see specific information pulled into the AVS if appropriate.

## 2023-04-28 NOTE — TELEPHONE ENCOUNTER
----- Message from KENNA Huntley sent at 4/28/2023  9:12 AM CDT -----  Please let patient know that labs are overall stable

## 2023-05-11 ENCOUNTER — TELEPHONE (OUTPATIENT)
Dept: FAMILY MEDICINE CLINIC | Facility: CLINIC | Age: 68
End: 2023-05-11
Payer: MEDICARE

## 2023-05-11 DIAGNOSIS — L13.0 DERMATITIS HERPETIFORMIS: Primary | ICD-10-CM

## 2023-05-11 NOTE — TELEPHONE ENCOUNTER
Patient called and states we put him on dapsone until  he gets in with dermatology. He states he has an appointment set up on 05-26-23 with Dr. 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 He states that Dr. Monge was doing labs every week. I asked him what specific lab work and he states he is unsure. I informed him that provider is out of office until tomorrow and we will call him back when we discus this with her. AMAURY.

## 2023-05-12 NOTE — TELEPHONE ENCOUNTER
CBC,CMP is all he should need. Patient will need to have dermatology take this over once he sees them.

## 2023-05-15 ENCOUNTER — LAB (OUTPATIENT)
Dept: LAB | Facility: HOSPITAL | Age: 68
End: 2023-05-15
Payer: MEDICARE

## 2023-05-15 DIAGNOSIS — L13.0 DERMATITIS HERPETIFORMIS: ICD-10-CM

## 2023-05-15 LAB
ALBUMIN SERPL-MCNC: 4.1 G/DL (ref 3.5–5)
ALBUMIN/GLOB SERPL: 2 G/DL (ref 1.1–2.5)
ALP SERPL-CCNC: 68 U/L (ref 24–120)
ALT SERPL W P-5'-P-CCNC: 27 U/L (ref 0–50)
ANION GAP SERPL CALCULATED.3IONS-SCNC: 1 MMOL/L (ref 4–13)
AST SERPL-CCNC: 32 U/L (ref 7–45)
AUTO MIXED CELLS #: 0.5 10*3/MM3 (ref 0.1–2.6)
AUTO MIXED CELLS %: 6.1 % (ref 0.1–24)
BILIRUB SERPL-MCNC: 1.2 MG/DL (ref 0.1–1)
BUN SERPL-MCNC: 15 MG/DL (ref 5–21)
BUN/CREAT SERPL: 13.2
CALCIUM SPEC-SCNC: 8.3 MG/DL (ref 8.4–10.4)
CHLORIDE SERPL-SCNC: 107 MMOL/L (ref 98–110)
CO2 SERPL-SCNC: 26 MMOL/L (ref 24–31)
CREAT SERPL-MCNC: 1.14 MG/DL (ref 0.5–1.4)
EGFRCR SERPLBLD CKD-EPI 2021: 70.1 ML/MIN/1.73
ERYTHROCYTE [DISTWIDTH] IN BLOOD BY AUTOMATED COUNT: 14 % (ref 12.3–15.4)
GLOBULIN UR ELPH-MCNC: 2.1 GM/DL
GLUCOSE SERPL-MCNC: 110 MG/DL (ref 70–100)
HCT VFR BLD AUTO: 33.7 % (ref 37.5–51)
HGB BLD-MCNC: 11.2 G/DL (ref 13–17.7)
LYMPHOCYTES # BLD AUTO: 1.4 10*3/MM3 (ref 0.7–3.1)
LYMPHOCYTES NFR BLD AUTO: 17.9 % (ref 19.6–45.3)
MCH RBC QN AUTO: 31.8 PG (ref 26.6–33)
MCHC RBC AUTO-ENTMCNC: 33.2 G/DL (ref 31.5–35.7)
MCV RBC AUTO: 95.7 FL (ref 79–97)
NEUTROPHILS NFR BLD AUTO: 5.8 10*3/MM3 (ref 1.7–7)
NEUTROPHILS NFR BLD AUTO: 76 % (ref 42.7–76)
PLATELET # BLD AUTO: 212 10*3/MM3 (ref 140–450)
PMV BLD AUTO: 8.8 FL (ref 6–12)
POTASSIUM SERPL-SCNC: 3.9 MMOL/L (ref 3.5–5.3)
PROT SERPL-MCNC: 6.2 G/DL (ref 6.3–8.7)
RBC # BLD AUTO: 3.52 10*6/MM3 (ref 4.14–5.8)
SODIUM SERPL-SCNC: 134 MMOL/L (ref 135–145)
WBC NRBC COR # BLD: 7.7 10*3/MM3 (ref 3.4–10.8)

## 2023-05-15 PROCEDURE — 85025 COMPLETE CBC W/AUTO DIFF WBC: CPT

## 2023-05-15 PROCEDURE — 80053 COMPREHEN METABOLIC PANEL: CPT

## 2023-05-15 PROCEDURE — 36415 COLL VENOUS BLD VENIPUNCTURE: CPT

## 2023-05-15 NOTE — TELEPHONE ENCOUNTER
Called patient and informed lab orders have been placed and that dermatology will need to take over once he sees them. AMAURY.

## 2023-05-17 ENCOUNTER — TELEPHONE (OUTPATIENT)
Dept: FAMILY MEDICINE CLINIC | Facility: CLINIC | Age: 68
End: 2023-05-17
Payer: MEDICARE

## 2023-05-17 NOTE — TELEPHONE ENCOUNTER
Called patient and informed of CMP: slgiht decrease in calcium and sodium but can be replaced with diet. CBC: decrease in rbc, and hgb. Patient would benefit from otc iron replacement. VU.Patient states he got into Dr. Ruiz a dermatologist and is going today. He would like to take a copy of his labs. Printed and left for patient to .

## 2023-05-17 NOTE — TELEPHONE ENCOUNTER
----- Message from KENNA Huntley sent at 5/17/2023  6:58 AM CDT -----  Please let patient know that labs show  CMP: slgiht decrease in calcium and sodium but can be replaced with diet  CBC: decrease in rbc, and hgb. Patient would benefit from otc iron replacement

## 2023-06-05 ENCOUNTER — TRANSCRIBE ORDERS (OUTPATIENT)
Dept: ADMINISTRATIVE | Facility: HOSPITAL | Age: 68
End: 2023-06-05
Payer: MEDICARE

## 2023-06-05 ENCOUNTER — LAB (OUTPATIENT)
Dept: LAB | Facility: HOSPITAL | Age: 68
End: 2023-06-05
Payer: MEDICARE

## 2023-06-05 DIAGNOSIS — L13.0 DERMATITIS HERPETIFORMIS: ICD-10-CM

## 2023-06-05 DIAGNOSIS — L13.0 DERMATITIS HERPETIFORMIS: Primary | ICD-10-CM

## 2023-06-05 LAB
ALBUMIN SERPL-MCNC: 4.1 G/DL (ref 3.5–5)
ALBUMIN/GLOB SERPL: 1.9 G/DL (ref 1.1–2.5)
ALP SERPL-CCNC: 78 U/L (ref 24–120)
ALT SERPL W P-5'-P-CCNC: 27 U/L (ref 0–50)
ANION GAP SERPL CALCULATED.3IONS-SCNC: 7 MMOL/L (ref 4–13)
AST SERPL-CCNC: 36 U/L (ref 7–45)
AUTO MIXED CELLS #: 0.6 10*3/MM3 (ref 0.1–2.6)
AUTO MIXED CELLS %: 10.2 % (ref 0.1–24)
BILIRUB SERPL-MCNC: 0.6 MG/DL (ref 0.1–1)
BUN SERPL-MCNC: 17 MG/DL (ref 5–21)
BUN/CREAT SERPL: 10.6
CALCIUM SPEC-SCNC: 8.4 MG/DL (ref 8.4–10.4)
CHLORIDE SERPL-SCNC: 106 MMOL/L (ref 98–110)
CO2 SERPL-SCNC: 28 MMOL/L (ref 24–31)
CREAT SERPL-MCNC: 1.6 MG/DL (ref 0.5–1.4)
EGFRCR SERPLBLD CKD-EPI 2021: 46.6 ML/MIN/1.73
ERYTHROCYTE [DISTWIDTH] IN BLOOD BY AUTOMATED COUNT: 12.4 % (ref 12.3–15.4)
GLOBULIN UR ELPH-MCNC: 2.2 GM/DL
GLUCOSE SERPL-MCNC: 113 MG/DL (ref 70–100)
HCT VFR BLD AUTO: 36.7 % (ref 37.5–51)
HGB BLD-MCNC: 12.6 G/DL (ref 13–17.7)
LYMPHOCYTES # BLD AUTO: 1.6 10*3/MM3 (ref 0.7–3.1)
LYMPHOCYTES NFR BLD AUTO: 30.4 % (ref 19.6–45.3)
MCH RBC QN AUTO: 33.1 PG (ref 26.6–33)
MCHC RBC AUTO-ENTMCNC: 34.3 G/DL (ref 31.5–35.7)
MCV RBC AUTO: 96.3 FL (ref 79–97)
NEUTROPHILS NFR BLD AUTO: 3.2 10*3/MM3 (ref 1.7–7)
NEUTROPHILS NFR BLD AUTO: 59.4 % (ref 42.7–76)
PLATELET # BLD AUTO: 214 10*3/MM3 (ref 140–450)
PMV BLD AUTO: 9.2 FL (ref 6–12)
POTASSIUM SERPL-SCNC: 4.4 MMOL/L (ref 3.5–5.3)
PROT SERPL-MCNC: 6.3 G/DL (ref 6.3–8.7)
RBC # BLD AUTO: 3.81 10*6/MM3 (ref 4.14–5.8)
SODIUM SERPL-SCNC: 141 MMOL/L (ref 135–145)
WBC NRBC COR # BLD: 5.4 10*3/MM3 (ref 3.4–10.8)

## 2023-06-05 PROCEDURE — 80053 COMPREHEN METABOLIC PANEL: CPT

## 2023-06-05 PROCEDURE — 36415 COLL VENOUS BLD VENIPUNCTURE: CPT

## 2023-06-05 PROCEDURE — 85025 COMPLETE CBC W/AUTO DIFF WBC: CPT

## 2023-07-28 ENCOUNTER — LAB (OUTPATIENT)
Dept: LAB | Facility: HOSPITAL | Age: 68
End: 2023-07-28
Payer: MEDICARE

## 2023-07-28 ENCOUNTER — TRANSCRIBE ORDERS (OUTPATIENT)
Dept: ADMINISTRATIVE | Facility: HOSPITAL | Age: 68
End: 2023-07-28
Payer: MEDICARE

## 2023-07-28 DIAGNOSIS — L13.0 DERMATITIS HERPETIFORMIS: ICD-10-CM

## 2023-07-28 DIAGNOSIS — L13.0 DERMATITIS HERPETIFORMIS: Primary | ICD-10-CM

## 2023-07-28 LAB
ALBUMIN SERPL-MCNC: 4.2 G/DL (ref 3.5–5)
ALBUMIN/GLOB SERPL: 1.7 G/DL (ref 1.1–2.5)
ALP SERPL-CCNC: 67 U/L (ref 24–120)
ALT SERPL W P-5'-P-CCNC: 27 U/L (ref 0–50)
ANION GAP SERPL CALCULATED.3IONS-SCNC: 6 MMOL/L (ref 4–13)
AST SERPL-CCNC: 32 U/L (ref 7–45)
AUTO MIXED CELLS #: 0.6 10*3/MM3 (ref 0.1–2.6)
AUTO MIXED CELLS %: 10.3 % (ref 0.1–24)
BILIRUB SERPL-MCNC: 0.5 MG/DL (ref 0.1–1)
BUN SERPL-MCNC: 13 MG/DL (ref 5–21)
BUN/CREAT SERPL: 12.4
CALCIUM SPEC-SCNC: 9 MG/DL (ref 8.4–10.4)
CHLORIDE SERPL-SCNC: 106 MMOL/L (ref 98–110)
CO2 SERPL-SCNC: 27 MMOL/L (ref 24–31)
CREAT SERPL-MCNC: 1.05 MG/DL (ref 0.5–1.4)
EGFRCR SERPLBLD CKD-EPI 2021: 77.3 ML/MIN/1.73
ERYTHROCYTE [DISTWIDTH] IN BLOOD BY AUTOMATED COUNT: 11.9 % (ref 12.3–15.4)
GLOBULIN UR ELPH-MCNC: 2.5 GM/DL
GLUCOSE SERPL-MCNC: 75 MG/DL (ref 70–100)
HCT VFR BLD AUTO: 43.5 % (ref 37.5–51)
HGB BLD-MCNC: 14.7 G/DL (ref 13–17.7)
LYMPHOCYTES # BLD AUTO: 1.6 10*3/MM3 (ref 0.7–3.1)
LYMPHOCYTES NFR BLD AUTO: 26.9 % (ref 19.6–45.3)
MCH RBC QN AUTO: 30.6 PG (ref 26.6–33)
MCHC RBC AUTO-ENTMCNC: 33.8 G/DL (ref 31.5–35.7)
MCV RBC AUTO: 90.4 FL (ref 79–97)
NEUTROPHILS NFR BLD AUTO: 3.8 10*3/MM3 (ref 1.7–7)
NEUTROPHILS NFR BLD AUTO: 62.8 % (ref 42.7–76)
PLATELET # BLD AUTO: 201 10*3/MM3 (ref 140–450)
PMV BLD AUTO: 9.4 FL (ref 6–12)
POTASSIUM SERPL-SCNC: 4.4 MMOL/L (ref 3.5–5.3)
PROT SERPL-MCNC: 6.7 G/DL (ref 6.3–8.7)
RBC # BLD AUTO: 4.81 10*6/MM3 (ref 4.14–5.8)
SODIUM SERPL-SCNC: 139 MMOL/L (ref 135–145)
WBC NRBC COR # BLD: 6 10*3/MM3 (ref 3.4–10.8)

## 2023-07-28 PROCEDURE — 36415 COLL VENOUS BLD VENIPUNCTURE: CPT | Performed by: LEGAL MEDICINE

## 2023-07-28 PROCEDURE — 80053 COMPREHEN METABOLIC PANEL: CPT | Performed by: LEGAL MEDICINE

## 2023-07-28 PROCEDURE — 85025 COMPLETE CBC W/AUTO DIFF WBC: CPT

## 2023-10-17 ENCOUNTER — OFFICE VISIT (OUTPATIENT)
Dept: FAMILY MEDICINE CLINIC | Facility: CLINIC | Age: 68
End: 2023-10-17
Payer: MEDICARE

## 2023-10-17 VITALS
DIASTOLIC BLOOD PRESSURE: 93 MMHG | HEIGHT: 72 IN | HEART RATE: 71 BPM | WEIGHT: 200 LBS | SYSTOLIC BLOOD PRESSURE: 142 MMHG | RESPIRATION RATE: 20 BRPM | BODY MASS INDEX: 27.09 KG/M2

## 2023-10-17 DIAGNOSIS — M54.16 ACUTE RADICULAR LOW BACK PAIN: ICD-10-CM

## 2023-10-17 DIAGNOSIS — S33.5XXA SPRAIN OF LOW BACK, INITIAL ENCOUNTER: Primary | ICD-10-CM

## 2023-10-17 PROBLEM — E78.5 HYPERLIPIDEMIA: Status: ACTIVE | Noted: 2023-10-17

## 2023-10-17 RX ORDER — DEXAMETHASONE SODIUM PHOSPHATE 10 MG/ML
8 INJECTION INTRAMUSCULAR; INTRAVENOUS ONCE
Status: COMPLETED | OUTPATIENT
Start: 2023-10-17 | End: 2023-10-17

## 2023-10-17 RX ORDER — PREDNISONE 10 MG/1
TABLET ORAL
Qty: 18 TABLET | Refills: 0 | Status: SHIPPED | OUTPATIENT
Start: 2023-10-17

## 2023-10-17 RX ORDER — CYCLOBENZAPRINE HCL 10 MG
10 TABLET ORAL NIGHTLY PRN
Qty: 20 TABLET | Refills: 0 | Status: SHIPPED | OUTPATIENT
Start: 2023-10-17

## 2023-10-17 RX ORDER — DICLOFENAC SODIUM 75 MG/1
75 TABLET, DELAYED RELEASE ORAL 2 TIMES DAILY PRN
Qty: 60 TABLET | Refills: 1 | Status: SHIPPED | OUTPATIENT
Start: 2023-10-17

## 2023-10-17 RX ADMIN — DEXAMETHASONE SODIUM PHOSPHATE 8 MG: 10 INJECTION INTRAMUSCULAR; INTRAVENOUS at 10:02

## 2023-10-17 NOTE — PROGRESS NOTES
After obtaining consent, and per orders of Melissa PIERSON, injection of Decadron 8mg administered to L dorso glutea IM. Given by Janina Pinto MA. Patient instructed to remain in clinic for 20 minutes afterwards, and to report any adverse reaction to me immediately. Pt tolerated well with no adverse reactions.

## 2023-10-17 NOTE — PROGRESS NOTES
"Chief Complaint  side pain     Subjective    History of Present Illness      Patient presents to [facility] for   History of Present Illness  Pt states he was shoveling and raking gravel in September and had R side pain after. He states it may be a pulled muscle. He started taking Diclofenac and the pain improved but has flares from time to time.        Review of Systems    I have reviewed and agree with the HPI and ROS information as above.  Melissa Lolaricki Gonzalez, APRN     Objective   Vital Signs:   /93   Pulse 71   Resp 20   Ht 182.9 cm (72\")   Wt 90.7 kg (200 lb)   BMI 27.12 kg/m²            Physical Exam  Constitutional:       Appearance: Normal appearance. He is well-developed.   HENT:      Head: Normocephalic and atraumatic.      Right Ear: Tympanic membrane, ear canal and external ear normal.      Left Ear: Tympanic membrane, ear canal and external ear normal.      Nose: Nose normal. No septal deviation, nasal tenderness or congestion.      Mouth/Throat:      Lips: Pink. No lesions.      Mouth: Mucous membranes are moist. No oral lesions.      Dentition: Normal dentition.      Pharynx: Oropharynx is clear. No pharyngeal swelling, oropharyngeal exudate or posterior oropharyngeal erythema.   Eyes:      General: Lids are normal. Vision grossly intact. No scleral icterus.        Right eye: No discharge.         Left eye: No discharge.      Extraocular Movements: Extraocular movements intact.      Conjunctiva/sclera: Conjunctivae normal.      Right eye: Right conjunctiva is not injected.      Left eye: Left conjunctiva is not injected.      Pupils: Pupils are equal, round, and reactive to light.   Neck:      Thyroid: No thyroid mass.      Trachea: Trachea normal.   Cardiovascular:      Rate and Rhythm: Normal rate and regular rhythm.      Heart sounds: Normal heart sounds. No murmur heard.     No gallop.   Pulmonary:      Effort: Pulmonary effort is normal.      Breath sounds: " Normal breath sounds and air entry. No wheezing, rhonchi or rales.   Abdominal:      General: There is no distension.      Palpations: Abdomen is soft. There is no mass.      Tenderness: There is no abdominal tenderness. There is no right CVA tenderness, left CVA tenderness, guarding or rebound.   Musculoskeletal:         General: No tenderness or deformity. Normal range of motion.      Cervical back: Full passive range of motion without pain, normal range of motion and neck supple.      Thoracic back: Normal.      Lumbar back: Tenderness present. No bony tenderness. Normal range of motion. Negative right straight leg raise test and negative left straight leg raise test.        Back:       Right lower leg: No edema.      Left lower leg: No edema.      Comments: Tender with bending, deep palpation, reproducible    Skin:     General: Skin is warm and dry.      Coloration: Skin is not jaundiced.      Findings: No rash.   Neurological:      Mental Status: He is alert and oriented to person, place, and time.      Sensory: Sensation is intact.      Motor: Motor function is intact.      Coordination: Coordination is intact.      Gait: Gait is intact.      Deep Tendon Reflexes: Reflexes are normal and symmetric.   Psychiatric:         Mood and Affect: Mood and affect normal.         Judgment: Judgment normal.              PHQ-2 Depression Screening  Little interest or pleasure in doing things? 0-->not at all   Feeling down, depressed, or hopeless? 0-->not at all   PHQ-2 Total Score 0     PHQ-9 Depression Screening  Little interest or pleasure in doing things? 0-->not at all   Feeling down, depressed, or hopeless? 0-->not at all   Trouble falling or staying asleep, or sleeping too much?     Feeling tired or having little energy?     Poor appetite or overeating?     Feeling bad about yourself - or that you are a failure or have let yourself or your family down?     Trouble concentrating on things, such as reading the  newspaper or watching television?     Moving or speaking so slowly that other people could have noticed? Or the opposite - being so fidgety or restless that you have been moving around a lot more than usual?     Thoughts that you would be better off dead, or of hurting yourself in some way?     PHQ-9 Total Score 0   If you checked off any problems, how difficult have these problems made it for you to do your work, take care of things at home, or get along with other people?        Result Review  Data Reviewed:                   Assessment and Plan      Diagnoses and all orders for this visit:    1. Sprain of low back, initial encounter (Primary)  -     dexAMETHasone (DECADRON) injection 8 mg  -     predniSONE (DELTASONE) 10 MG tablet; 1 tab PO TID x 3 days, then 1 tab PO BID x 3 days, then 1 tab PO daily x 3 days  Dispense: 18 tablet; Refill: 0  -     cyclobenzaprine (FLEXERIL) 10 MG tablet; Take 1 tablet by mouth At Night As Needed for Muscle Spasms.  Dispense: 20 tablet; Refill: 0  -     diclofenac (VOLTAREN) 75 MG EC tablet; Take 1 tablet by mouth 2 (Two) Times a Day As Needed (back pain).  Dispense: 60 tablet; Refill: 1    2. Acute radicular low back pain  -     dexAMETHasone (DECADRON) injection 8 mg  -     predniSONE (DELTASONE) 10 MG tablet; 1 tab PO TID x 3 days, then 1 tab PO BID x 3 days, then 1 tab PO daily x 3 days  Dispense: 18 tablet; Refill: 0  -     cyclobenzaprine (FLEXERIL) 10 MG tablet; Take 1 tablet by mouth At Night As Needed for Muscle Spasms.  Dispense: 20 tablet; Refill: 0  -     diclofenac (VOLTAREN) 75 MG EC tablet; Take 1 tablet by mouth 2 (Two) Times a Day As Needed (back pain).  Dispense: 60 tablet; Refill: 1    Plan:   Treat acutely with steroids, muscle relaxer and then can start diclofenac prn. He declines any imaging at this time.         Follow Up   Return if symptoms worsen or fail to improve.  Patient was given instructions and counseling regarding his condition or for health  maintenance advice. Please see specific information pulled into the AVS if appropriate.

## 2023-11-07 ENCOUNTER — HOSPITAL ENCOUNTER (EMERGENCY)
Facility: HOSPITAL | Age: 68
Discharge: HOME | End: 2023-11-07
Admitting: STUDENT IN AN ORGANIZED HEALTH CARE EDUCATION/TRAINING PROGRAM
Payer: MEDICARE

## 2023-11-07 VITALS
DIASTOLIC BLOOD PRESSURE: 97 MMHG | HEART RATE: 71 BPM | RESPIRATION RATE: 18 BRPM | OXYGEN SATURATION: 97 % | WEIGHT: 196 LBS | HEIGHT: 72 IN | SYSTOLIC BLOOD PRESSURE: 136 MMHG | TEMPERATURE: 98.3 F | BODY MASS INDEX: 26.55 KG/M2

## 2023-11-07 DIAGNOSIS — R30.0 DYSURIA: Primary | ICD-10-CM

## 2023-11-07 LAB
ANION GAP SERPL CALCULATED.3IONS-SCNC: 9 MMOL/L (ref 5–15)
BACTERIA UR QL AUTO: ABNORMAL /HPF
BASOPHILS # BLD AUTO: 0.04 10*3/MM3 (ref 0–0.2)
BASOPHILS NFR BLD AUTO: 0.5 % (ref 0–1.5)
BILIRUB UR QL STRIP: NEGATIVE
BUN SERPL-MCNC: 20 MG/DL (ref 8–23)
BUN/CREAT SERPL: 14.8 (ref 7–25)
CALCIUM SPEC-SCNC: 9.5 MG/DL (ref 8.6–10.5)
CHLORIDE SERPL-SCNC: 105 MMOL/L (ref 98–107)
CLARITY UR: CLEAR
CO2 SERPL-SCNC: 25 MMOL/L (ref 22–29)
COLOR UR: YELLOW
CREAT SERPL-MCNC: 1.35 MG/DL (ref 0.76–1.27)
DEPRECATED RDW RBC AUTO: 40.8 FL (ref 37–54)
EGFRCR SERPLBLD CKD-EPI 2021: 56.8 ML/MIN/1.73
EOSINOPHIL # BLD AUTO: 0.17 10*3/MM3 (ref 0–0.4)
EOSINOPHIL NFR BLD AUTO: 2 % (ref 0.3–6.2)
ERYTHROCYTE [DISTWIDTH] IN BLOOD BY AUTOMATED COUNT: 12.2 % (ref 12.3–15.4)
GLUCOSE SERPL-MCNC: 98 MG/DL (ref 65–99)
GLUCOSE UR STRIP-MCNC: NEGATIVE MG/DL
HCT VFR BLD AUTO: 44.3 % (ref 37.5–51)
HGB BLD-MCNC: 14.5 G/DL (ref 13–17.7)
HGB UR QL STRIP.AUTO: NEGATIVE
HOLD SPECIMEN: NORMAL
HYALINE CASTS UR QL AUTO: ABNORMAL /LPF
IMM GRANULOCYTES # BLD AUTO: 0.02 10*3/MM3 (ref 0–0.05)
IMM GRANULOCYTES NFR BLD AUTO: 0.2 % (ref 0–0.5)
KETONES UR QL STRIP: ABNORMAL
LEUKOCYTE ESTERASE UR QL STRIP.AUTO: ABNORMAL
LYMPHOCYTES # BLD AUTO: 1.74 10*3/MM3 (ref 0.7–3.1)
LYMPHOCYTES NFR BLD AUTO: 20.2 % (ref 19.6–45.3)
MCH RBC QN AUTO: 30 PG (ref 26.6–33)
MCHC RBC AUTO-ENTMCNC: 32.7 G/DL (ref 31.5–35.7)
MCV RBC AUTO: 91.7 FL (ref 79–97)
MONOCYTES # BLD AUTO: 0.65 10*3/MM3 (ref 0.1–0.9)
MONOCYTES NFR BLD AUTO: 7.5 % (ref 5–12)
NEUTROPHILS NFR BLD AUTO: 6.01 10*3/MM3 (ref 1.7–7)
NEUTROPHILS NFR BLD AUTO: 69.6 % (ref 42.7–76)
NITRITE UR QL STRIP: NEGATIVE
NRBC BLD AUTO-RTO: 0 /100 WBC (ref 0–0.2)
PH UR STRIP.AUTO: 6.5 [PH] (ref 5–8)
PLATELET # BLD AUTO: 231 10*3/MM3 (ref 140–450)
PMV BLD AUTO: 9.9 FL (ref 6–12)
POTASSIUM SERPL-SCNC: 4.4 MMOL/L (ref 3.5–5.2)
PROT UR QL STRIP: NEGATIVE
RBC # BLD AUTO: 4.83 10*6/MM3 (ref 4.14–5.8)
RBC # UR STRIP: ABNORMAL /HPF
REF LAB TEST METHOD: ABNORMAL
SODIUM SERPL-SCNC: 139 MMOL/L (ref 136–145)
SP GR UR STRIP: 1.03 (ref 1–1.03)
SQUAMOUS #/AREA URNS HPF: ABNORMAL /HPF
UROBILINOGEN UR QL STRIP: ABNORMAL
WBC # UR STRIP: ABNORMAL /HPF
WBC NRBC COR # BLD: 8.63 10*3/MM3 (ref 3.4–10.8)

## 2023-11-07 PROCEDURE — 85025 COMPLETE CBC W/AUTO DIFF WBC: CPT

## 2023-11-07 PROCEDURE — 99283 EMERGENCY DEPT VISIT LOW MDM: CPT

## 2023-11-07 PROCEDURE — 80048 BASIC METABOLIC PNL TOTAL CA: CPT

## 2023-11-07 PROCEDURE — 81001 URINALYSIS AUTO W/SCOPE: CPT

## 2023-11-07 PROCEDURE — 51798 US URINE CAPACITY MEASURE: CPT

## 2023-11-07 RX ORDER — SODIUM CHLORIDE 0.9 % (FLUSH) 0.9 %
10 SYRINGE (ML) INJECTION AS NEEDED
Status: DISCONTINUED | OUTPATIENT
Start: 2023-11-07 | End: 2023-11-07 | Stop reason: HOSPADM

## 2023-11-07 RX ORDER — TAMSULOSIN HYDROCHLORIDE 0.4 MG/1
1 CAPSULE ORAL DAILY
Qty: 30 CAPSULE | Refills: 0 | Status: SHIPPED | OUTPATIENT
Start: 2023-11-07

## 2023-11-07 RX ORDER — TAMSULOSIN HYDROCHLORIDE 0.4 MG/1
0.4 CAPSULE ORAL ONCE
Status: COMPLETED | OUTPATIENT
Start: 2023-11-07 | End: 2023-11-07

## 2023-11-07 RX ADMIN — TAMSULOSIN HYDROCHLORIDE 0.4 MG: 0.4 CAPSULE ORAL at 19:26

## 2023-11-07 NOTE — ED PROVIDER NOTES
"Subjective   History of Present Illness  Patient is a 68-year-old male that presents to the emergency department for urinary retention and dysuria.  Patient reports over the last 2 weeks he has noticed that he has had a decrease in urinary stream.  He states that about a week ago he felt like he was not emptying his bladder completely.  Patient states he went to bed last night with no urinary issues.  He states that his last normal urination was in the middle the night last night.  He states that he woke up this morning and was unable to urinate.  He states that he has been trying to go every 20 minutes with only minimal amount of urine describing it as \"small dribbles \".  Patient denies any actual abdominal or bladder pain or discomfort but he states that he does have a slight feeling of bladder fullness.  Patient denies any fevers, cough or congestion.  Denies any abdominal pain, shortness of breath, chest pain, nausea, vomiting, constipation or diarrhea. Patient reports during his last physical he was informed that he has an enlarged prostate but reports his PSA has never been abnormal and was told that this is normal with aging.  Patient states that he was planning on following up with his primary care provider this week regarding the symptoms.  He reports that he recently got back in town from [place] yesterday but has noticed over the last 2 days while on vacation he has noticed urine has been more malodorous than normal.  He denies any history of urinary tract infections.  Past medical history includes hyperlipidemia, and GERD.      Review of Systems   Genitourinary:  Positive for decreased urine volume, difficulty urinating, dysuria, frequency and urgency.   All other systems reviewed and are negative.      Past Medical History:   Diagnosis Date    Arthritis     Colon polyp 3 years ago    GERD (gastroesophageal reflux disease)     History of transfusion     Hx of colonic polyp     Hypercholesteremia     " Shingles        Allergies   Allergen Reactions    Gluten Meal Other (See Comments)     Skin rash dermitis    Statins Other (See Comments)     Tried  2 different meds. Had muscle aches with each       Past Surgical History:   Procedure Laterality Date    COLONOSCOPY N/A 2019    Tubular adenoma at 60 cm repeat exam in 5 years    COLONOSCOPY W/ POLYPECTOMY  2014    Tubular adenoma at 60 cm repeat exam in 3 years    ENDOSCOPY  2014    Small HH otherwise normal exam    ENDOSCOPY N/A 3/20/2023    Procedure: ESOPHAGOGASTRODUODENOSCOPY WITH ANESTHESIA;  Surgeon: Kota Silvestre MD;  Location: [facility] ENDOSCOPY;  Service: Gastroenterology;  Laterality: N/A;  pre GERD  post gastric polyp  [facility]    EYE SURGERY      retina    HAND SURGERY      2022    KNEE SURGERY      x 2    LUNG SURGERY      Due to complicated pneumonia    ROTATOR CUFF REPAIR         Family History   Problem Relation Age of Onset    No Known Problems Mother     No Known Problems Father     Colon cancer Neg Hx     Colon polyps Neg Hx     Esophageal cancer Neg Hx        Social History     Socioeconomic History    Marital status:    Tobacco Use    Smoking status: Former     Packs/day: 0.25     Years: 8.00     Additional pack years: 0.00     Total pack years: 2.00     Types: Cigarettes     Start date: 1974     Quit date: 1992     Years since quittin.7    Smokeless tobacco: Former     Types: Chew    Tobacco comments:     started and stopped many times, total 8 years of light smoking   Vaping Use    Vaping Use: Never used   Substance and Sexual Activity    Alcohol use: Yes     Alcohol/week: 6.0 standard drinks of alcohol     Types: 6 Cans of beer per week    Drug use: Never    Sexual activity: Yes     Partners: Female           Objective   Physical Exam  Vitals and nursing note reviewed.   Constitutional:       Appearance: Normal appearance.      Comments: Nontoxic appearing. In no acute distress.     HENT:      Head: Normocephalic and atraumatic.      Right Ear: External ear normal.      Left Ear: External ear normal.      Nose: Nose normal.      Mouth/Throat:      Mouth: Mucous membranes are moist.      Pharynx: Oropharynx is clear.   Eyes:      Extraocular Movements: Extraocular movements intact.      Conjunctiva/sclera: Conjunctivae normal.      Pupils: Pupils are equal, round, and reactive to light.   Cardiovascular:      Rate and Rhythm: Normal rate and regular rhythm.      Pulses: Normal pulses.      Heart sounds: Normal heart sounds.   Pulmonary:      Effort: Pulmonary effort is normal. No respiratory distress.      Breath sounds: Normal breath sounds. No wheezing.   Chest:      Chest wall: No tenderness.   Abdominal:      General: Bowel sounds are normal. There is no distension.      Palpations: Abdomen is soft.      Tenderness: There is no abdominal tenderness. There is no right CVA tenderness, left CVA tenderness, guarding or rebound.   Musculoskeletal:         General: Normal range of motion.      Cervical back: Normal range of motion and neck supple.      Right lower leg: No edema.      Left lower leg: No edema.   Skin:     General: Skin is warm and dry.      Capillary Refill: Capillary refill takes less than 2 seconds.   Neurological:      General: No focal deficit present.      Mental Status: He is alert and oriented to person, place, and time. Mental status is at baseline.   Psychiatric:         Mood and Affect: Mood normal.         Behavior: Behavior normal.         Thought Content: Thought content normal.         Judgment: Judgment normal.       Labs Reviewed   URINALYSIS W/ CULTURE IF INDICATED - Abnormal; Notable for the following components:       Result Value    Ketones, UA Trace (*)     Leuk Esterase, UA Small (1+) (*)     All other components within normal limits    Narrative:     In absence of clinical symptoms, the presence of pyuria, bacteria, and/or nitrites on the urinalysis result  does not correlate with infection.   URINALYSIS, MICROSCOPIC ONLY - Abnormal; Notable for the following components:    WBC, UA 3-5 (*)     All other components within normal limits   BASIC METABOLIC PANEL - Abnormal; Notable for the following components:    Creatinine 1.35 (*)     eGFR 56.8 (*)     All other components within normal limits    Narrative:     GFR Normal >60  Chronic Kidney Disease <60  Kidney Failure <15     CBC WITH AUTO DIFFERENTIAL - Abnormal; Notable for the following components:    RDW 12.2 (*)     All other components within normal limits   RAINBOW URINE   CBC AND DIFFERENTIAL    Narrative:     The following orders were created for panel order CBC & Differential.  Procedure                               Abnormality         Status                     ---------                               -----------         ------                     CBC Auto Differential[[id number]]        Abnormal            Final result                 Please view results for these tests on the individual orders.        Procedures           ED Course  ED Course as of 11/08/23 1848   Tue Nov 07, 2023   1623 Instructed nursing staff to get bladder scan as patient is here for urinary retention.  [KF]   1700 Instructed nursing staff again to bladder scan patient to evaluate for urinary retention and to update me once done.  [KF]   1727 Bladder scan of 179 [KF]      ED Course User Index  [KF] Roman Duncan, APRN                                           Medical Decision Making  Chao MARILIA Leif Johnson is a 68 y.o. male who presents to the ED for urinary retention and dysuria.  Patient reports over the last 2 weeks he has noticed that he has had a decrease in urinary stream.  He states that about a week ago he felt like he was not emptying his bladder completely.  Patient states he went to bed last night with no urinary issues.  He states that his last normal urination was in the middle the night last night.  He states that he woke  "up this morning and was unable to urinate.  He states that he has been trying to go every 20 minutes with only minimal amount of urine describing it as \"small dribbles \".  Patient denies any actual abdominal or bladder pain or discomfort but he states that he does have a slight feeling of bladder fullness.  Patient denies any fevers, cough or congestion.  Denies any abdominal pain, shortness of breath, chest pain, nausea, vomiting, constipation or diarrhea. Patient reports during his last physical he was informed that he has an enlarged prostate but reports his PSA has never been abnormal and was told that this is normal with aging.  Patient states that he was planning on following up with his primary care provider this week regarding the symptoms.  He reports that he recently got back in town from [place] yesterday but has noticed over the last 2 days while on vacation he has noticed urine has been more malodorous than normal.  He denies any history of urinary tract infections.  Past medical history includes hyperlipidemia, and GERD.    Patient was non-toxic appearing on arrival. No acute distress was noted.  Vital signs stable.    Past medical history, surgical history, and medication regimen reviewed.     Previous notes, labs, imaging and more reviewed.    Patient's presentation raises suspicion for differentials including, but not limited to, urinary tract infection, urinary retention, urinary outlet obstruction, acute kidney injury.    Given findings described above, patient's presentation is likely consistent with dysuria and urinary urgency. I have a low suspicion for retention, obstruction, or RAUL at this point in their ED course.     Medications  tamsulosin (FLOMAX) 24 hr capsule 0.4 mg (0.4 mg Oral Given 11/7/23 1926)      Upon reevaluation patient reports he was able to urinate normally without any issues prior to discharge.    I had an in-depth discussion with the patient regarding all lab results " completed during this ED encounter.  I discussed with the patient that bladder scan revealed less than 200 mL of urine retained and urinalysis reveals no evidence of urinary tract infection.  I discussed that patient be discharged home on Flomax to help with the feeling of urinary retention.  I also discussed with the patient that he will need to follow-up with urologist for further evaluation and treatment of this as it could be related to his enlarged prostate.  I discussed if symptoms do not improve or continue to worsen prior to seeing urology he can return to the ED for further evaluation and treatment.  We also discussed the importance of monitoring his blood pressure on Flomax as he could have some side effects from Flomax to reduce his blood pressure.  I answered all the questions regarding the emergency department evaluation, diagnosis, and treatment plan in plain and simple language that was understandable. We discussed that due to always having some diagnostic uncertainty while in the ER, there is always a chance that symptoms may change or new symptoms may reveal themselves after being discharged. Because of this, I stressed the importance of Chao following up with their PCP and urology. Patient informed that appointment will need to be done by calling their office to set up an appointment within the next few days or as soon as reasonably possible so that the symptoms can be re-evaluated for improvement or for any other questions. I also gave Chao common sense return precautions and prompted patient to return to the emergency department within 24 - 48hrs if there are any new, worsening, or concerning symptoms. The patient verbalized understanding of the discharge instructions and agreed with them. Chao was discharged in stable condition.     Dragon disclaimer:  Parts of this note may be an electronic transcription/translation of spoken language to printed text using the Dragon dictation  system.    Problems Addressed:  Dysuria: complicated acute illness or injury    Amount and/or Complexity of Data Reviewed  Labs: ordered.    Risk  Prescription drug management.        Final diagnoses:   Dysuria       ED Disposition  ED Disposition       ED Disposition   Discharge    Condition   Stable    Comment   --               Juanito Biggs MD  [address] [facility]  [address]  [place] [id number]  [phone]    Schedule an appointment as soon as possible for a visit       Vipul Jim MD  [address] [facility]  [address]  [place] [address]  [phone]    Schedule an appointment as soon as possible for a visit       [facility] EMERGENCY DEPARTMENT  [address] [place] [id number] [phone]    If symptoms worsen         Medication List        New Prescriptions      tamsulosin 0.4 MG capsule 24 hr capsule  Commonly known as: FLOMAX  Take 1 capsule by mouth Daily.               Where to Get Your Medications        These medications were sent to [facility] [address] [place] [address] - [address] - [phone] [facility] [phone]   [address] [facility] [address]      Phone: [phone]   tamsulosin 0.4 MG capsule 24 hr capsule            Roman Duncan, APRN  11/08/23 4879

## 2023-11-08 NOTE — DISCHARGE INSTRUCTIONS
It was very nice to meet you, Chao. Thank you for allowing us to take care of you today at [facility].    Today you were seen in the emergency department for your symptoms. Please understand that an ER evaluation is just the start of your evaluation. We do the best we can, but we are often unable to fully find what is causing your symptoms from one evaluation.  Because of this, the goal is to determine whether you need to be evaluated in the hospital or if it is safe for you to go home and see other doctors provided such as primary care physicians or specialist on an outpatient basis.     Like we discussed, I strongly urge that you follow up with your primary care doctor and urology. Please call their office to set up an appointment as soon as possible so that you can be re-evaluated for improvement in your symptoms or for any other questions.  I have provided the information needed, including phone number, to call to set up an appointment below in these discharge papers.     Educational material has also been provided in the following pages regarding what we have discussed today.     MEDICATIONS PRESCRIBED: Flomax    Please return to the emergency room within 12-48 hours if you experience symptoms such as the following:   Fever, chills, chest pain or shortness of breath, pain with inspiration/expiration, pain that travels to your arms, neck or back, nausea, vomiting, severe headache, tearing pain in your chest, dizziness, feel as though you are about to pass out, OR if you have any worsening symptoms, or any other concerns.

## 2023-12-14 ENCOUNTER — OFFICE VISIT (OUTPATIENT)
Dept: FAMILY MEDICINE CLINIC | Facility: CLINIC | Age: 68
End: 2023-12-14
Payer: MEDICARE

## 2023-12-14 ENCOUNTER — LAB (OUTPATIENT)
Dept: LAB | Facility: HOSPITAL | Age: 68
End: 2023-12-14
Payer: MEDICARE

## 2023-12-14 VITALS
OXYGEN SATURATION: 98 % | HEIGHT: 72 IN | DIASTOLIC BLOOD PRESSURE: 89 MMHG | WEIGHT: 200 LBS | SYSTOLIC BLOOD PRESSURE: 131 MMHG | HEART RATE: 84 BPM | BODY MASS INDEX: 27.09 KG/M2

## 2023-12-14 DIAGNOSIS — K21.9 GASTROESOPHAGEAL REFLUX DISEASE WITHOUT ESOPHAGITIS: ICD-10-CM

## 2023-12-14 DIAGNOSIS — Z23 IMMUNIZATION DUE: ICD-10-CM

## 2023-12-14 DIAGNOSIS — L13.0 DH (DERMATITIS HERPETIFORMIS): ICD-10-CM

## 2023-12-14 DIAGNOSIS — Z00.00 MEDICARE ANNUAL WELLNESS VISIT, SUBSEQUENT: ICD-10-CM

## 2023-12-14 DIAGNOSIS — N40.0 BENIGN PROSTATIC HYPERPLASIA WITHOUT LOWER URINARY TRACT SYMPTOMS: ICD-10-CM

## 2023-12-14 DIAGNOSIS — Z12.5 SCREENING FOR MALIGNANT NEOPLASM OF PROSTATE: ICD-10-CM

## 2023-12-14 DIAGNOSIS — Z00.00 MEDICARE ANNUAL WELLNESS VISIT, SUBSEQUENT: Primary | ICD-10-CM

## 2023-12-14 LAB
ALBUMIN SERPL-MCNC: 4.4 G/DL (ref 3.5–5)
ALBUMIN/GLOB SERPL: 1.6 G/DL (ref 1.1–2.5)
ALP SERPL-CCNC: 80 U/L (ref 24–120)
ALT SERPL W P-5'-P-CCNC: 31 U/L (ref 0–50)
ANION GAP SERPL CALCULATED.3IONS-SCNC: 5 MMOL/L (ref 4–13)
AST SERPL-CCNC: 32 U/L (ref 7–45)
AUTO MIXED CELLS #: 0.6 10*3/MM3 (ref 0.1–2.6)
AUTO MIXED CELLS %: 8.2 % (ref 0.1–24)
BACTERIA UR QL AUTO: ABNORMAL /HPF
BILIRUB SERPL-MCNC: 0.9 MG/DL (ref 0.1–1)
BILIRUB UR QL STRIP: NEGATIVE
BUN SERPL-MCNC: 15 MG/DL (ref 5–21)
BUN/CREAT SERPL: 13.3
CALCIUM SPEC-SCNC: 9.7 MG/DL (ref 8.4–10.4)
CHLORIDE SERPL-SCNC: 105 MMOL/L (ref 98–110)
CHOLEST SERPL-MCNC: 238 MG/DL (ref 130–200)
CLARITY UR: CLEAR
CO2 SERPL-SCNC: 28 MMOL/L (ref 24–31)
COLOR UR: YELLOW
CREAT SERPL-MCNC: 1.13 MG/DL (ref 0.5–1.4)
EGFRCR SERPLBLD CKD-EPI 2021: 70.4 ML/MIN/1.73
ERYTHROCYTE [DISTWIDTH] IN BLOOD BY AUTOMATED COUNT: 12.3 % (ref 12.3–15.4)
GLOBULIN UR ELPH-MCNC: 2.7 GM/DL
GLUCOSE SERPL-MCNC: 97 MG/DL (ref 70–100)
GLUCOSE UR STRIP-MCNC: NEGATIVE MG/DL
HCT VFR BLD AUTO: 46.1 % (ref 37.5–51)
HDLC SERPL-MCNC: 72 MG/DL
HGB BLD-MCNC: 14.9 G/DL (ref 13–17.7)
HGB UR QL STRIP.AUTO: NEGATIVE
HYALINE CASTS UR QL AUTO: ABNORMAL /LPF
KETONES UR QL STRIP: NEGATIVE
LDLC SERPL CALC-MCNC: 121 MG/DL (ref 0–99)
LDLC/HDLC SERPL: 1.58 {RATIO}
LEUKOCYTE ESTERASE UR QL STRIP.AUTO: ABNORMAL
LYMPHOCYTES # BLD AUTO: 1.9 10*3/MM3 (ref 0.7–3.1)
LYMPHOCYTES NFR BLD AUTO: 25.3 % (ref 19.6–45.3)
MCH RBC QN AUTO: 30 PG (ref 26.6–33)
MCHC RBC AUTO-ENTMCNC: 32.3 G/DL (ref 31.5–35.7)
MCV RBC AUTO: 92.8 FL (ref 79–97)
NEUTROPHILS NFR BLD AUTO: 5 10*3/MM3 (ref 1.7–7)
NEUTROPHILS NFR BLD AUTO: 66.5 % (ref 42.7–76)
NITRITE UR QL STRIP: NEGATIVE
PH UR STRIP.AUTO: 7.5 [PH] (ref 5–8)
PLATELET # BLD AUTO: 252 10*3/MM3 (ref 140–450)
PMV BLD AUTO: 9.7 FL (ref 6–12)
POTASSIUM SERPL-SCNC: 4.6 MMOL/L (ref 3.5–5.3)
PROT SERPL-MCNC: 7.1 G/DL (ref 6.3–8.7)
PROT UR QL STRIP: NEGATIVE
RBC # BLD AUTO: 4.97 10*6/MM3 (ref 4.14–5.8)
RBC # UR STRIP: ABNORMAL /HPF
REF LAB TEST METHOD: ABNORMAL
SODIUM SERPL-SCNC: 138 MMOL/L (ref 135–145)
SP GR UR STRIP: 1.01 (ref 1–1.03)
SQUAMOUS #/AREA URNS HPF: ABNORMAL /HPF
TRIGL SERPL-MCNC: 260 MG/DL (ref 0–149)
UROBILINOGEN UR QL STRIP: ABNORMAL
VLDLC SERPL-MCNC: 45 MG/DL (ref 5–40)
WBC # UR STRIP: ABNORMAL /HPF
WBC NRBC COR # BLD AUTO: 7.5 10*3/MM3 (ref 3.4–10.8)

## 2023-12-14 PROCEDURE — 80053 COMPREHEN METABOLIC PANEL: CPT

## 2023-12-14 PROCEDURE — 85025 COMPLETE CBC W/AUTO DIFF WBC: CPT

## 2023-12-14 PROCEDURE — 81001 URINALYSIS AUTO W/SCOPE: CPT

## 2023-12-14 PROCEDURE — 87086 URINE CULTURE/COLONY COUNT: CPT

## 2023-12-14 PROCEDURE — 80061 LIPID PANEL: CPT

## 2023-12-14 PROCEDURE — 87147 CULTURE TYPE IMMUNOLOGIC: CPT

## 2023-12-14 PROCEDURE — 36415 COLL VENOUS BLD VENIPUNCTURE: CPT

## 2023-12-14 PROCEDURE — G0103 PSA SCREENING: HCPCS

## 2023-12-14 RX ORDER — ESOMEPRAZOLE MAGNESIUM 40 MG/1
40 CAPSULE, DELAYED RELEASE ORAL
Qty: 90 CAPSULE | Refills: 3 | Status: SHIPPED | OUTPATIENT
Start: 2023-12-14

## 2023-12-14 RX ORDER — TAMSULOSIN HYDROCHLORIDE 0.4 MG/1
1 CAPSULE ORAL DAILY
Qty: 30 CAPSULE | Refills: 2 | Status: SHIPPED | OUTPATIENT
Start: 2023-12-14

## 2023-12-14 NOTE — PROGRESS NOTES
The ABCs of the Annual Wellness Visit  Subsequent Medicare Wellness Visit    Subjective    Chao Yadav Jr. is a 68 y.o. male who presents for a Subsequent Medicare Wellness Visit.    The following portions of the patient's history were reviewed and   updated as appropriate: allergies, current medications, past family history, past medical history, past social history, past surgical history, and problem list.    Compared to one year ago, the patient feels his physical   health is the same.    Compared to one year ago, the patient feels his mental   health is better.    Recent Hospitalizations:  He was not admitted to the hospital during the last year.       Current Medical Providers:  Patient Care Team:  Juanito Biggs MD as PCP - General (Pediatrics)    Outpatient Medications Prior to Visit   Medication Sig Dispense Refill    dapsone 100 MG tablet Please take 1 tab PO daily 30 tablet 2    esomeprazole (nexIUM) 40 MG capsule Take 1 capsule by mouth Every Morning Before Breakfast. 90 capsule 3    tamsulosin (FLOMAX) 0.4 MG capsule 24 hr capsule Take 1 capsule by mouth Daily. (Patient not taking: Reported on 12/14/2023) 30 capsule 0    cyclobenzaprine (FLEXERIL) 10 MG tablet Take 1 tablet by mouth At Night As Needed for Muscle Spasms. 20 tablet 0    diclofenac (VOLTAREN) 75 MG EC tablet Take 1 tablet by mouth 2 (Two) Times a Day As Needed (back pain). 60 tablet 1    fexofenadine (ALLEGRA) 180 MG tablet Take 1 tablet by mouth Daily. 90 tablet 3    predniSONE (DELTASONE) 10 MG tablet 1 tab PO TID x 3 days, then 1 tab PO BID x 3 days, then 1 tab PO daily x 3 days 18 tablet 0     No facility-administered medications prior to visit.       No opioid medication identified on active medication list. I have reviewed chart for other potential  high risk medication/s and harmful drug interactions in the elderly.        Aspirin is not on active medication list.  Aspirin use is not indicated based on review of current medical  "condition/s. Risk of harm outweighs potential benefits.  .    Patient Active Problem List   Diagnosis    Hx of adenomatous colonic polyps    BRBPR (bright red blood per rectum)    Bowel habit changes    Nonsmoker    Anal fissure    Medicare annual wellness visit, subsequent    Erectile dysfunction    Gastroesophageal reflux disease without esophagitis    Vitamin D deficiency    Screening for malignant neoplasm of prostate    Trigeminal neuralgia of right side of face    Abdominal pain, epigastric    Gastroesophageal reflux disease with esophagitis without hemorrhage    Hiatal hernia with GERD    Gluten intolerance    Actinic keratosis due to exposure to sunlight    Skin tag    Hyperlipidemia    Gluten enteropathy    Immunization due    Benign prostatic hyperplasia without lower urinary tract symptoms    DH (dermatitis herpetiformis)     Advance Care Planning   Advance Care Planning     Advance Directive is not on file.  ACP discussion was held with the patient during this visit. Patient has an advance directive (not in EMR), copy requested.     Objective    Vitals:    23 0928   BP: 131/89   Pulse: 84   SpO2: 98%   Weight: 90.7 kg (200 lb)   Height: 182.9 cm (72\")     Estimated body mass index is 27.12 kg/m² as calculated from the following:    Height as of this encounter: 182.9 cm (72\").    Weight as of this encounter: 90.7 kg (200 lb).           Does the patient have evidence of cognitive impairment? No    Lab Results   Component Value Date    TRIG 260 (H) 2023    HDL 72 2023     (H) 2023    VLDL 45 (H) 2023        HEALTH RISK ASSESSMENT    Smoking Status:  Social History     Tobacco Use   Smoking Status Former    Packs/day: 0.25    Years: 8.00    Additional pack years: 0.00    Total pack years: 2.00    Types: Cigarettes    Start date: 1974    Quit date: 1992    Years since quittin.8   Smokeless Tobacco Former    Types: Chew   Tobacco Comments    started and " stopped many times, total 8 years of light smoking     Alcohol Consumption:  Social History     Substance and Sexual Activity   Alcohol Use Yes    Alcohol/week: 6.0 standard drinks of alcohol    Types: 6 Cans of beer per week     Fall Risk Screen:    LOYDA Fall Risk Assessment was completed, and patient is at LOW risk for falls.Assessment completed on:2023    Depression Screenin/14/2023     9:28 AM   PHQ-2/PHQ-9 Depression Screening   Little Interest or Pleasure in Doing Things 0-->not at all   Feeling Down, Depressed or Hopeless 0-->not at all   PHQ-9: Brief Depression Severity Measure Score 0       Health Habits and Functional and Cognitive Screenin/14/2023     6:10 AM   Functional & Cognitive Status   Do you have difficulty preparing food and eating? No   Do you have difficulty bathing yourself, getting dressed or grooming yourself? No   Do you have difficulty using the toilet? No   Do you have difficulty moving around from place to place? No   Do you have trouble with steps or getting out of a bed or a chair? No   Current Diet Other   Dental Exam Up to date   Eye Exam Up to date   Exercise (times per week) Other   Current Exercises Include Hiking;Stair Step Machine;Walking;Yard Work   Do you need help using the phone?  No   Are you deaf or do you have serious difficulty hearing?  Yes   Do you need help to go to places out of walking distance? No   Do you need help shopping? No   Do you need help preparing meals?  No   Do you need help with housework?  No   Do you need help with laundry? No   Do you need help taking your medications? No   Do you need help managing money? No   Do you ever drive or ride in a car without wearing a seat belt? No   Have you felt unusual stress, anger or loneliness in the last month? No   Who do you live with? Spouse   If you need help, do you have trouble finding someone available to you? No   Have you been bothered in the last four weeks by sexual problems?  No   Do you have difficulty concentrating, remembering or making decisions? No       Age-appropriate Screening Schedule:  Refer to the list below for future screening recommendations based on patient's age, sex and/or medical conditions. Orders for these recommended tests are listed in the plan section. The patient has been provided with a written plan.    Health Maintenance   Topic Date Due    Pneumococcal Vaccine 65+ (1 - PCV) Never done    ZOSTER VACCINE (2 of 2) 10/04/2021    COVID-19 Vaccine (6 - 2023-24 season) 09/01/2023    INFLUENZA VACCINE  03/31/2024 (Originally 8/1/2023)    COLORECTAL CANCER SCREENING  03/25/2024    BMI FOLLOWUP  04/28/2024    ANNUAL WELLNESS VISIT  12/14/2024    LIPID PANEL  12/14/2024    TDAP/TD VACCINES (2 - Td or Tdap) 03/14/2028    HEPATITIS C SCREENING  Completed    AAA SCREEN (ONE-TIME)  Completed                  CMS Preventative Services Quick Reference  Risk Factors Identified During Encounter  Immunizations Discussed/Encouraged: Influenza  The above risks/problems have been discussed with the patient.  Pertinent information has been shared with the patient in the After Visit Summary.  An After Visit Summary and PPPS were made available to the patient.    Follow Up:   Next Medicare Wellness visit to be scheduled in 1 year.       Additional E&M Note during same encounter follows:  Patient has multiple medical problems which are significant and separately identifiable that require additional work above and beyond the Medicare Wellness Visit.      Chief Complaint  Medicare Wellness-subsequent  Wants to discuss flare up after vaccine covid, new problems with gluten and his bph was new problem        Subjective        HPI  Chao Yadav Jr. is also being seen today to discuss continuation of using Flomax and dermitis that came about in March of 2023. He would like to discuss if the COVID vaccine could have triggered this.     He also wants to discuss his bph issues and refill of  "med for his DH         Objective   Vital Signs:  /89   Pulse 84   Ht 182.9 cm (72\")   Wt 90.7 kg (200 lb)   SpO2 98%   BMI 27.12 kg/m²     Physical Exam  Constitutional:       Appearance: Normal appearance. He is normal weight.   Cardiovascular:      Rate and Rhythm: Normal rate and regular rhythm.      Heart sounds: Normal heart sounds.   Pulmonary:      Effort: Pulmonary effort is normal.      Breath sounds: Normal breath sounds.   Neurological:      Mental Status: He is alert.   Psychiatric:         Mood and Affect: Mood normal.         Behavior: Behavior normal.                         Assessment and Plan   Diagnoses and all orders for this visit:    1. Medicare annual wellness visit, subsequent (Primary)  Assessment & Plan:  Discussed healthy nutrition and safety    no cell phone use driving.    Orders:  -     CBC Auto Differential; Future  -     Comprehensive Metabolic Panel; Future  -     Lipid Panel; Future  -     Urinalysis With Culture If Indicated - Urine, Clean Catch; Future    2. Immunization due  -     Cancel: Fluzone >6 Months ([id number])    3. Screening for malignant neoplasm of prostate  -     PSA Screen; Future    4. Benign prostatic hyperplasia without lower urinary tract symptoms  Assessment & Plan:  Now he is doing better but wishes to have flomax available   discussed urolift and urology referral if problem gets worse    Orders:  -     tamsulosin (FLOMAX) 0.4 MG capsule 24 hr capsule; Take 1 capsule by mouth Daily.  Dispense: 30 capsule; Refill: 2    5. DH (dermatitis herpetiformis)  -     betamethasone valerate (VALISONE) 0.1 % ointment; Apply 1 application  topically to the appropriate area as directed 2 (Two) Times a Day.  Dispense: 45 g; Refill: 1    6. Gastroesophageal reflux disease without esophagitis  -     esomeprazole (nexIUM) 40 MG capsule; Take 1 capsule by mouth Every Morning Before Breakfast.  Dispense: 90 capsule; Refill: 3    Other orders  -     Nirmatrelvir & " Ritonavir, 300mg/100mg, (PAXLOVID) 20 x 150 MG & 10 x 100MG tablet therapy pack tablet; Take 3 tablets by mouth 2 (Two) Times a Day.  Dispense: 30 tablet; Refill: 0             Follow Up   No follow-ups on file.  Patient was given instructions and counseling regarding his condition or for health maintenance advice. Please see specific information pulled into the AVS if appropriate.

## 2023-12-14 NOTE — ASSESSMENT & PLAN NOTE
Now he is doing better but wishes to have flomax available   discussed urolift and urology referral if problem gets worse

## 2023-12-15 ENCOUNTER — TELEPHONE (OUTPATIENT)
Dept: FAMILY MEDICINE CLINIC | Facility: CLINIC | Age: 68
End: 2023-12-15
Payer: MEDICARE

## 2023-12-15 DIAGNOSIS — E78.5 HYPERLIPIDEMIA, UNSPECIFIED HYPERLIPIDEMIA TYPE: Primary | ICD-10-CM

## 2023-12-15 LAB — PSA SERPL-MCNC: 1.7 NG/ML (ref 0–4)

## 2023-12-15 NOTE — TELEPHONE ENCOUNTER
Sent pt "[employer] message relaying below    RELAY  Your cholesterol is elevated at 238. To avoid being placed on medication, you will really need to decrease the amounts of fats and carbs you're eating. We will re-check this in 3 months. I've already placed the lab orders so just go by the lab next door some time in March. Please let me know if you have any questions.

## 2023-12-15 NOTE — TELEPHONE ENCOUNTER
----- Message from Juanito Biggs MD sent at 12/14/2023  5:22 PM CST -----  We need to be a little concerned about his cholesterol at 238 which is elevated with the bad cholesterol as well very hard on diet we will see if we can avoid any medication recheck in 3 months

## 2023-12-16 LAB — BACTERIA SPEC AEROBE CULT: ABNORMAL

## 2023-12-19 ENCOUNTER — TELEPHONE (OUTPATIENT)
Dept: FAMILY MEDICINE CLINIC | Facility: CLINIC | Age: 68
End: 2023-12-19
Payer: MEDICARE

## 2023-12-19 NOTE — TELEPHONE ENCOUNTER
Sent pt [employer] message relaying below    RELAY  Dr. Biggs said he is satisfied with your labs. Please let me know if you have any questions.

## 2024-03-04 ENCOUNTER — LAB (OUTPATIENT)
Dept: LAB | Facility: HOSPITAL | Age: 69
End: 2024-03-04
Payer: MEDICARE

## 2024-03-04 DIAGNOSIS — E78.5 HYPERLIPIDEMIA, UNSPECIFIED HYPERLIPIDEMIA TYPE: ICD-10-CM

## 2024-03-04 LAB
CHOLEST SERPL-MCNC: 203 MG/DL (ref 130–200)
HDLC SERPL-MCNC: 56 MG/DL
LDLC SERPL CALC-MCNC: 127 MG/DL (ref 0–99)
LDLC/HDLC SERPL: 2.23 {RATIO}
TRIGL SERPL-MCNC: 112 MG/DL (ref 0–149)
VLDLC SERPL-MCNC: 20 MG/DL (ref 5–40)

## 2024-03-04 PROCEDURE — 80061 LIPID PANEL: CPT

## 2024-03-04 PROCEDURE — 36415 COLL VENOUS BLD VENIPUNCTURE: CPT

## 2024-03-13 ENCOUNTER — TELEPHONE (OUTPATIENT)
Dept: FAMILY MEDICINE CLINIC | Facility: CLINIC | Age: 69
End: 2024-03-13
Payer: MEDICARE

## 2024-03-13 NOTE — TELEPHONE ENCOUNTER
Sent pt [employer] message relaying below    HUB TO RELAY  Your cholesterol is improved to 203. Dr. Biggs said you are on the right track; he wants you to continue doing what you're doing. Please let me know if you have any questions.

## 2024-03-13 NOTE — TELEPHONE ENCOUNTER
----- Message from Juanito Biggs MD sent at 3/13/2024  1:23 PM CDT -----  Cholesterol is improved to 203 the bad cholesterol 127 this is a right track should be less than 99

## 2024-08-06 ENCOUNTER — OFFICE VISIT (OUTPATIENT)
Dept: FAMILY MEDICINE CLINIC | Facility: CLINIC | Age: 69
End: 2024-08-06
Payer: MEDICARE

## 2024-08-06 VITALS
SYSTOLIC BLOOD PRESSURE: 136 MMHG | WEIGHT: 196 LBS | TEMPERATURE: 96.6 F | OXYGEN SATURATION: 94 % | HEIGHT: 72 IN | HEART RATE: 60 BPM | RESPIRATION RATE: 20 BRPM | DIASTOLIC BLOOD PRESSURE: 88 MMHG | BODY MASS INDEX: 26.55 KG/M2

## 2024-08-06 DIAGNOSIS — L13.0 DH (DERMATITIS HERPETIFORMIS): ICD-10-CM

## 2024-08-06 DIAGNOSIS — L82.1 SEBORRHEIC KERATOSIS: ICD-10-CM

## 2024-08-06 DIAGNOSIS — G50.0 TRIGEMINAL NEURALGIA OF RIGHT SIDE OF FACE: Primary | ICD-10-CM

## 2024-08-06 DIAGNOSIS — Z12.11 COLON CANCER SCREENING: ICD-10-CM

## 2024-08-06 PROCEDURE — 99214 OFFICE O/P EST MOD 30 MIN: CPT | Performed by: STUDENT IN AN ORGANIZED HEALTH CARE EDUCATION/TRAINING PROGRAM

## 2024-08-06 PROCEDURE — 1126F AMNT PAIN NOTED NONE PRSNT: CPT | Performed by: STUDENT IN AN ORGANIZED HEALTH CARE EDUCATION/TRAINING PROGRAM

## 2024-08-06 RX ORDER — GABAPENTIN 100 MG/1
100 CAPSULE ORAL 3 TIMES DAILY
Qty: 90 CAPSULE | Refills: 1 | Status: SHIPPED | OUTPATIENT
Start: 2024-08-06

## 2024-08-06 NOTE — PROGRESS NOTES
Chief Complaint  headaches    Subjective        Loye D Leif Jr. presents to [facility]    HPI    HA  -Historically triggered by the wearing of safety glasses and resultant pressure on side of head, started about 10 years ago. Describes R ear numbness and R temporal pain that extend down on cheek and some around ear. This will result in R-sided HA. Sx re-emerged about 3 weeks ago after applying safety glasses again while doing house renovations. HA always on R side. Describes sharp pain, numbness followed by HA. No visual sx. Treated in past by Dr. Biggs for trigeminal neuralgia w/ gabapentin 100 mg prn which previously worked well. He has outdated rx with him.    Takes dapsone qod for dermatitis herpetiformis. No current symptoms. Requesting referral to see [facility]. Pt has darker spot on back he'd like me to look at/remove. It is not bothering him. He is overdue for CLN, previous 2019 found a polyp. He would like to avoid CLN.    Past Medical History:   Diagnosis Date    Allergic unknown    Arthritis     Cataract eye surgery 5 years ago    Colon polyp 3 years ago    GERD (gastroesophageal reflux disease)     History of transfusion     Hx of colonic polyp     Hypercholesteremia     Shingles      Past Surgical History:   Procedure Laterality Date    COLONOSCOPY N/A 03/25/2019    Tubular adenoma at 60 cm repeat exam in 5 years    COLONOSCOPY W/ POLYPECTOMY  04/17/2014    Tubular adenoma at 60 cm repeat exam in 3 years    ENDOSCOPY  04/17/2014    Small HH otherwise normal exam    ENDOSCOPY N/A 3/20/2023    Procedure: ESOPHAGOGASTRODUODENOSCOPY WITH ANESTHESIA;  Surgeon: Kota Silvestre MD;  Location: [facility] ENDOSCOPY;  Service: Gastroenterology;  Laterality: N/A;  pre GERD  post gastric polyp  [facility]    EYE SURGERY  2019    retina    HAND SURGERY      July 2022    KNEE SURGERY      x 2    LUNG SURGERY      Due to complicated pneumonia    ROTATOR  "CUFF REPAIR       Social History     Socioeconomic History    Marital status:    Tobacco Use    Smoking status: Former     Current packs/day: 0.00     Average packs/day: 0.3 packs/day for 18.0 years (4.5 ttl pk-yrs)     Types: Cigarettes     Start date: 1974     Quit date: 1992     Years since quittin.5    Smokeless tobacco: Former     Types: Chew    Tobacco comments:     started and stopped many times, total 8 years of light smoking   Vaping Use    Vaping status: Never Used   Substance and Sexual Activity    Alcohol use: Yes     Alcohol/week: 6.0 standard drinks of alcohol     Types: 6 Cans of beer per week    Drug use: Never    Sexual activity: Yes     Partners: Female       Objective   Vital Signs:  /88   Pulse 60   Temp 96.6 °F (35.9 °C) (Temporal)   Resp 20   Ht 182.9 cm (72.01\")   Wt 88.9 kg (196 lb)   SpO2 94%   BMI 26.58 kg/m²   Estimated body mass index is 26.58 kg/m² as calculated from the following:    Height as of this encounter: 182.9 cm (72.01\").    Weight as of this encounter: 88.9 kg (196 lb).       BMI is >= 25 and <30. (Overweight) The following options were offered after discussion;: exercise counseling/recommendations and nutrition counseling/recommendations      Physical Exam  Skin:            Comments: ~2 cm round, well-circumscribed, slightly raised pigmented lesion as depicted on back. Even coloration and borders.        Result Review :                   Assessment and Plan   Diagnoses and all orders for this visit:    1. Trigeminal neuralgia of right side of face (Primary)  -Discussed trigger which is protective eyewear. Ok to continue gabapentin prn, fu if worsens.  -     gabapentin (NEURONTIN) 100 MG capsule; Take 1 capsule by mouth 3 (Three) Times a Day.  Dispense: 90 capsule; Refill: 1    2. Seborrheic keratosis       -Discussed leaving benign lesion alone vs shave removal. Pt would like to schedule separately to have me remove lesion.    3. DH " (dermatitis herpetiformis)  -     Ambulatory Referral to Dermatology    4. Colon cancer screening  -     Ambulatory Referral to Gastroenterology             EMR Dragon/Transcription disclaimer:   Much of this encounter note is an electronic transcription/translation of spoken language to printed text. The electronic translation of spoken language may permit erroneous, or at times, nonsensical words or phrases to be inadvertently transcribed; although attempts have made to review the note for such errors, some may still exist. Please excuse any unrecognized transcription errors and contact us if the air is unintelligible or needs documented correction. Also, portions of this note have been copied forward, however, changed to reflect the most current clinical status of this patient.  Follow Up   Return in about 1 week (around 8/13/2024).  Patient was given instructions and counseling regarding his condition or for health maintenance advice. Please see specific information pulled into the AVS if appropriate.

## 2024-08-06 NOTE — PATIENT INSTRUCTIONS
[facility] GASTROENTEROLOG[facility]  [address], [address], [address]  [place] [address] [phone]

## 2024-08-13 ENCOUNTER — OFFICE VISIT (OUTPATIENT)
Dept: FAMILY MEDICINE CLINIC | Facility: CLINIC | Age: 69
End: 2024-08-13
Payer: MEDICARE

## 2024-08-13 VITALS
OXYGEN SATURATION: 98 % | HEIGHT: 72 IN | TEMPERATURE: 97.8 F | HEART RATE: 74 BPM | RESPIRATION RATE: 20 BRPM | BODY MASS INDEX: 26.68 KG/M2 | WEIGHT: 197 LBS

## 2024-08-13 DIAGNOSIS — L82.1 SEBORRHEIC KERATOSIS: Primary | ICD-10-CM

## 2024-08-13 NOTE — PROGRESS NOTES
Chief Complaint  skin procedure    Subjective        Chao Yadav JrNasra presents to [facility]    HPI    Here for SK removal. See previous note for clinical details.  See procedure note below.    Past Medical History:   Diagnosis Date    Allergic unknown    Arthritis     Cataract eye surgery 5 years ago    Colon polyp 3 years ago    GERD (gastroesophageal reflux disease)     History of transfusion     Hx of colonic polyp     Hypercholesteremia     Shingles      Past Surgical History:   Procedure Laterality Date    COLONOSCOPY N/A 2019    Tubular adenoma at 60 cm repeat exam in 5 years    COLONOSCOPY W/ POLYPECTOMY  2014    Tubular adenoma at 60 cm repeat exam in 3 years    ENDOSCOPY  2014    Small HH otherwise normal exam    ENDOSCOPY N/A 3/20/2023    Procedure: ESOPHAGOGASTRODUODENOSCOPY WITH ANESTHESIA;  Surgeon: Kota Silvestre MD;  Location: [facility] ENDOSCOPY;  Service: Gastroenterology;  Laterality: N/A;  pre GERD  post gastric polyp  [facility] martinez aprn    EYE SURGERY      retina    HAND SURGERY      2022    KNEE SURGERY      x 2    LUNG SURGERY      Due to complicated pneumonia    ROTATOR CUFF REPAIR       Social History     Socioeconomic History    Marital status:    Tobacco Use    Smoking status: Former     Current packs/day: 0.00     Average packs/day: 0.3 packs/day for 18.0 years (4.5 ttl pk-yrs)     Types: Cigarettes     Start date: 1974     Quit date: 1992     Years since quittin.5    Smokeless tobacco: Former     Types: Chew    Tobacco comments:     started and stopped many times, total 8 years of light smoking   Vaping Use    Vaping status: Never Used   Substance and Sexual Activity    Alcohol use: Yes     Alcohol/week: 6.0 standard drinks of alcohol     Types: 6 Cans of beer per week    Drug use: Never    Sexual activity: Yes     Partners: Female       Objective   Vital Signs:  Pulse 74   Temp 97.8 °F (36.6  "°C) (Temporal)   Resp 20   Ht 182.9 cm (72.01\")   Wt 89.4 kg (197 lb)   SpO2 98%   BMI 26.71 kg/m²   Estimated body mass index is 26.71 kg/m² as calculated from the following:    Height as of this encounter: 182.9 cm (72.01\").    Weight as of this encounter: 89.4 kg (197 lb).              Physical Exam  Skin:            Comments: SK on L lower back as noted previously        Result Review :                   Assessment and Plan   Diagnoses and all orders for this visit:    1. Seborrheic keratosis (Primary)    Shave Biopsy Procedure note  After obtaining informed consent patient was placed in supine position. Lesion and area for biopsy on Left lower back was identified and cleansed w/ alcohol wipe prior to injection w/ 1cc  1% lidocaine w/o epinephrine.   Biopsy site was prepped w/ betadine then lesion removed w/ dermablade. Lesion not sent to pathology due to obvious benign clinical appearance.. Hemostasis achieved w/ compression w/ gauze along w/ single application of silver nitrate. Minimal amount of bleeding observed. Wound was dressed w/ neosporin ointment and bandaid. Pt tolerated procedure well. After-care instructions provided w/ RTC precautions. All questions addressed.             EMR Dragon/Transcription disclaimer:   Much of this encounter note is an electronic transcription/translation of spoken language to printed text. The electronic translation of spoken language may permit erroneous, or at times, nonsensical words or phrases to be inadvertently transcribed; although attempts have made to review the note for such errors, some may still exist. Please excuse any unrecognized transcription errors and contact us if the air is unintelligible or needs documented correction. Also, portions of this note have been copied forward, however, changed to reflect the most current clinical status of this patient.  Follow Up   No follow-ups on file.  Patient was given instructions and counseling regarding his " condition or for health maintenance advice. Please see specific information pulled into the AVS if appropriate.

## 2024-09-03 ENCOUNTER — OFFICE VISIT (OUTPATIENT)
Dept: GASTROENTEROLOGY | Facility: CLINIC | Age: 69
End: 2024-09-03
Payer: MEDICARE

## 2024-09-03 VITALS
DIASTOLIC BLOOD PRESSURE: 84 MMHG | BODY MASS INDEX: 26.74 KG/M2 | WEIGHT: 197.4 LBS | HEIGHT: 72 IN | SYSTOLIC BLOOD PRESSURE: 136 MMHG | TEMPERATURE: 96.9 F | HEART RATE: 69 BPM | OXYGEN SATURATION: 97 %

## 2024-09-03 DIAGNOSIS — Z86.010 HX OF ADENOMATOUS COLONIC POLYPS: Primary | ICD-10-CM

## 2024-09-03 PROCEDURE — S0260 H&P FOR SURGERY: HCPCS | Performed by: CLINICAL NURSE SPECIALIST

## 2024-09-03 RX ORDER — SODIUM, POTASSIUM,MAG SULFATES 17.5-3.13G
SOLUTION, RECONSTITUTED, ORAL ORAL
Qty: 177 ML | Refills: 0 | Status: SHIPPED | OUTPATIENT
Start: 2024-09-03

## 2024-09-03 NOTE — PROGRESS NOTES
Chao Yadav Jr.  1955      9/3/2024  Chief Complaint   Patient presents with    Colonoscopy     Hx of polyps-colon recall     Subjective   HPI  Chao Yadav Jr. is a 68 y.o. male who presents as a referral for preventative maintenance. He has no complaints of nausea or vomiting. No change in bowels. No wt loss. No BRBPR. No melena. There is no family hx for colon cancer. No abdominal pain.  Past Medical History:   Diagnosis Date    Allergic unknown    Arthritis     Cataract eye surgery 5 years ago    Colon polyp 3 years ago    GERD (gastroesophageal reflux disease)     Hernia 20 years ago    History of transfusion     Hx of colonic polyp     Hypercholesteremia     Shingles      Past Surgical History:   Procedure Laterality Date    COLONOSCOPY N/A 03/25/2019    -Normal examined duodenum. - Normal stomach. Biopsied. - Multiple fundic gland polyps. Biopsied. - Z-line regular, 42 cm from the incisors    COLONOSCOPY W/ POLYPECTOMY  04/17/2014    Tubular adenoma at 60 cm repeat exam in 3 years    ENDOSCOPY  04/17/2014    Small HH otherwise normal exam    ENDOSCOPY N/A 03/20/2023    Dr. Steele-Biopsy fundus polyp: Gastric fundic gland polyp    EYE SURGERY  2019    retina    HAND SURGERY      July 2022    KNEE SURGERY      x 2    LUNG SURGERY      Due to complicated pneumonia    ROTATOR CUFF REPAIR      UPPER GASTROINTESTINAL ENDOSCOPY  Dont recall     Outpatient Medications Marked as Taking for the 9/3/24 encounter (Office Visit) with Esther Moore APRN   Medication Sig Dispense Refill    dapsone 100 MG tablet Please take 1 tab PO daily 30 tablet 2    esomeprazole (nexIUM) 40 MG capsule Take 1 capsule by mouth Every Morning Before Breakfast. 90 capsule 3    gabapentin (NEURONTIN) 100 MG capsule Take 1 capsule by mouth 3 (Three) Times a Day. 90 capsule 1     Allergies   Allergen Reactions    Gluten Meal Other (See Comments)     Skin rash dermitis    Statins Other (See Comments)     Tried  2  different meds. Had muscle aches with each     Social History     Socioeconomic History    Marital status:    Tobacco Use    Smoking status: Former     Current packs/day: 0.00     Average packs/day: 0.3 packs/day for 32.0 years (8.0 ttl pk-yrs)     Types: Cigarettes     Start date: 1974     Quit date: 1992     Years since quittin.5    Smokeless tobacco: Former     Types: Chew    Tobacco comments:     started and stopped many times, total 8 years of light smoking   Vaping Use    Vaping status: Never Used   Substance and Sexual Activity    Alcohol use: Not Currently     Alcohol/week: 6.0 standard drinks of alcohol     Types: 6 Cans of beer per week    Drug use: Never    Sexual activity: Yes     Partners: Female     Family History   Problem Relation Age of Onset    No Known Problems Mother     No Known Problems Father     Colon cancer Neg Hx     Colon polyps Neg Hx     Esophageal cancer Neg Hx      Health Maintenance   Topic Date Due    Pneumococcal Vaccine 65+ (1 of 1 - PCV) Never done    ZOSTER VACCINE (2 of 2) 10/04/2021    COLORECTAL CANCER SCREENING  2024    COVID-19 Vaccine ( season) 2024    INFLUENZA VACCINE  2024    ANNUAL WELLNESS VISIT  2024    LIPID PANEL  2025    BMI FOLLOWUP  2025    TDAP/TD VACCINES (2 - Td or Tdap) 2028    HEPATITIS C SCREENING  Completed    AAA SCREEN (ONE-TIME)  Completed       REVIEW OF SYSTEMS  General: well appearing, no fever chills or sweats, no unexplained wt loss  HEENT: no acute visual or hearing disturbances  Cardiovascular: No chest pain or palpitations  Pulmonary: No shortness of breath, coughing, wheezing or hemoptysis  : No burning, urgency, hematuria, or dysuria  Musculoskeletal: No joint pain or stiffness  Peripheral: no edema  Skin: No lesions or rashes  Neuro: No dizziness, headaches, stroke, syncope  Endocrine: No hot or cold intolerances  Hematological: No blood dyscrasias    Objective  "  Vitals:    09/03/24 0927   BP: 136/84   BP Location: Left arm   Pulse: 69   Temp: 96.9 °F (36.1 °C)   TempSrc: Temporal   SpO2: 97%   Weight: 89.5 kg (197 lb 6.4 oz)   Height: 182.9 cm (72.01\")     Body mass index is 26.77 kg/m².         PHYSICAL EXAM  General: age appropriate well nourished well appearing, no acute distress  Head: normocephalic and atraumatic  Global assessment-supple  Neck-No JVD noted, no lymphadenopathy  Pulmonary-clear to auscultation bilaterally, normal respiratory effort  Cardiovascular-normal rate and rhythm, normal heart sounds, S1 and S2 noted  Abdomen-soft, non tender, non distended, normal bowel sounds all 4 quadrants, no hepatosplenomegaly noted  Extremities-No clubbing cyanosis or edema  Neuro-Non focal, converses appropriately, awake, alert, oriented    Assessment & Plan     Diagnoses and all orders for this visit:    1. Hx of adenomatous colonic polyps (Primary)  -     sodium-potassium-magnesium sulfates (Suprep Bowel Prep Kit) 17.5-3.13-1.6 GM/177ML solution oral solution; Take as directed by office instructions provided  Dispense: 177 mL; Refill: 0  -     Case Request; Standing  -     Case Request    Other orders  -     Implement Anesthesia Orders Day of Procedure; Standing  -     Follow Anesthesia Guidelines / Protocol; Future  -     Obtain Informed Consent; Future  -     Verify bowel prep was successful; Standing  -     Obtain Informed Consent; Standing        COLONOSCOPY WITH ANESTHESIA (N/A)  Body mass index is 26.77 kg/m².    Patient instructions on prep prior to procedure provided to the patient.    All risks, benefits, alternatives, and indications of colonoscopy procedure have been discussed with the patient. Risks to include perforation of the colon requiring possible surgery or colostomy, risk of bleeding from biopsies or removal of colon tissue, possibility of missing a colon polyp or cancer, or adverse drug reaction.  Benefits to include the diagnosis and management " of disease of the colon and rectum. Alternatives to include barium enema, radiographic evaluation, lab testing or no intervention. Pt verbalizes understanding and agrees.     Esther Moore, APRN  9/3/2024  09:57 CDT      IF YOU SMOKE OR USE TOBACCO PLEASE READ THE FOLLOWIN minutes reading provided    Why is smoking bad for me?  Smoking increases the risk of heart disease, lung disease, vascular disease, stroke, and cancer.     If you smoke, STOP!    If you would like more information on quitting smoking, please visit the [employer] website: [email or web address]   This link will provide additional resources including the QUIT line and the Beat the Pack support groups.     For more information:    Quit Now [place]  -QUIT-NOW  [email or web address]

## 2024-10-18 ENCOUNTER — TELEPHONE (OUTPATIENT)
Dept: GASTROENTEROLOGY | Facility: CLINIC | Age: 69
End: 2024-10-18

## 2024-10-18 NOTE — TELEPHONE ENCOUNTER
Caller: ANTONIETTA SILVA    Relationship to patient: SELF    Best call back number: [phone]     Chief complaint: CONFLICT APPTS    Type of visit: COLONOSCOPY    Requested date: ANY TIME LAST WEEK OF NOVEMBER OR IN DECEMBER EARLY MORNING    If rescheduling, when is the original appointment: 11/6/24     Additional notes:OK TO CALL BACK ANYTIME. OK TO LEAVE .

## 2024-11-11 ENCOUNTER — TRANSCRIBE ORDERS (OUTPATIENT)
Dept: ADMINISTRATIVE | Facility: HOSPITAL | Age: 69
End: 2024-11-11
Payer: MEDICARE

## 2024-11-11 ENCOUNTER — LAB (OUTPATIENT)
Dept: LAB | Facility: HOSPITAL | Age: 69
End: 2024-11-11
Payer: MEDICARE

## 2024-11-11 DIAGNOSIS — L13.0 DERMATITIS HERPETIFORMIS: Primary | ICD-10-CM

## 2024-11-11 DIAGNOSIS — L13.0 DERMATITIS HERPETIFORMIS: ICD-10-CM

## 2024-11-11 LAB
ALBUMIN SERPL-MCNC: 4.2 G/DL (ref 3.5–5)
ALBUMIN/GLOB SERPL: 1.6 G/DL (ref 1.1–2.5)
ALP SERPL-CCNC: 91 U/L (ref 24–120)
ALT SERPL W P-5'-P-CCNC: 30 U/L (ref 0–50)
ANION GAP SERPL CALCULATED.3IONS-SCNC: 7 MMOL/L (ref 4–13)
AST SERPL-CCNC: 35 U/L (ref 7–45)
AUTO MIXED CELLS #: 0.6 10*3/MM3 (ref 0.1–2.6)
AUTO MIXED CELLS %: 9.5 % (ref 0.1–24)
BILIRUB SERPL-MCNC: 0.6 MG/DL (ref 0.1–1)
BUN SERPL-MCNC: 18 MG/DL (ref 5–21)
BUN/CREAT SERPL: 16.4
CALCIUM SPEC-SCNC: 9.1 MG/DL (ref 8.6–10.5)
CHLORIDE SERPL-SCNC: 103 MMOL/L (ref 98–110)
CO2 SERPL-SCNC: 27 MMOL/L (ref 24–31)
CREAT SERPL-MCNC: 1.1 MG/DL (ref 0.5–1.4)
EGFRCR SERPLBLD CKD-EPI 2021: 72.2 ML/MIN/1.73
ERYTHROCYTE [DISTWIDTH] IN BLOOD BY AUTOMATED COUNT: 12.8 % (ref 12.3–15.4)
GLOBULIN UR ELPH-MCNC: 2.6 GM/DL
GLUCOSE SERPL-MCNC: 100 MG/DL (ref 70–100)
HCT VFR BLD AUTO: 45.1 % (ref 37.5–51)
HGB BLD-MCNC: 14.7 G/DL (ref 13–17.7)
LYMPHOCYTES # BLD AUTO: 1.7 10*3/MM3 (ref 0.7–3.1)
LYMPHOCYTES NFR BLD AUTO: 25.3 % (ref 19.6–45.3)
MCH RBC QN AUTO: 29.6 PG (ref 26.6–33)
MCHC RBC AUTO-ENTMCNC: 32.6 G/DL (ref 31.5–35.7)
MCV RBC AUTO: 90.7 FL (ref 79–97)
NEUTROPHILS NFR BLD AUTO: 4.5 10*3/MM3 (ref 1.7–7)
NEUTROPHILS NFR BLD AUTO: 65.2 % (ref 42.7–76)
PLATELET # BLD AUTO: 247 10*3/MM3 (ref 140–450)
PMV BLD AUTO: 9.6 FL (ref 6–12)
POTASSIUM SERPL-SCNC: 4.7 MMOL/L (ref 3.5–5.3)
PROT SERPL-MCNC: 6.8 G/DL (ref 6.3–8.7)
RBC # BLD AUTO: 4.97 10*6/MM3 (ref 4.14–5.8)
SODIUM SERPL-SCNC: 137 MMOL/L (ref 135–145)
WBC NRBC COR # BLD AUTO: 6.8 10*3/MM3 (ref 3.4–10.8)

## 2024-11-11 PROCEDURE — 85025 COMPLETE CBC W/AUTO DIFF WBC: CPT

## 2024-11-11 PROCEDURE — 80053 COMPREHEN METABOLIC PANEL: CPT

## 2024-11-11 PROCEDURE — 36415 COLL VENOUS BLD VENIPUNCTURE: CPT

## 2024-12-10 ENCOUNTER — ANESTHESIA EVENT (OUTPATIENT)
Dept: GASTROENTEROLOGY | Facility: HOSPITAL | Age: 69
End: 2024-12-10
Payer: MEDICARE

## 2024-12-10 ENCOUNTER — HOSPITAL ENCOUNTER (OUTPATIENT)
Facility: HOSPITAL | Age: 69
Setting detail: HOSPITAL OUTPATIENT SURGERY
Discharge: HOME | End: 2024-12-10
Attending: INTERNAL MEDICINE | Admitting: INTERNAL MEDICINE
Payer: MEDICARE

## 2024-12-10 ENCOUNTER — ANESTHESIA (OUTPATIENT)
Dept: GASTROENTEROLOGY | Facility: HOSPITAL | Age: 69
End: 2024-12-10
Payer: MEDICARE

## 2024-12-10 VITALS
HEART RATE: 77 BPM | TEMPERATURE: 97.7 F | BODY MASS INDEX: 25.73 KG/M2 | HEIGHT: 72 IN | DIASTOLIC BLOOD PRESSURE: 88 MMHG | SYSTOLIC BLOOD PRESSURE: 121 MMHG | WEIGHT: 190 LBS | OXYGEN SATURATION: 96 % | RESPIRATION RATE: 21 BRPM

## 2024-12-10 PROCEDURE — G0105 COLORECTAL SCRN; HI RISK IND: HCPCS | Performed by: INTERNAL MEDICINE

## 2024-12-10 PROCEDURE — 25010000002 PROPOFOL 1000 MG/100ML EMULSION

## 2024-12-10 RX ORDER — PROPOFOL 10 MG/ML
INJECTION, EMULSION INTRAVENOUS AS NEEDED
Status: DISCONTINUED | OUTPATIENT
Start: 2024-12-10 | End: 2024-12-10 | Stop reason: SURG

## 2024-12-10 RX ORDER — LIDOCAINE HYDROCHLORIDE 10 MG/ML
0.5 INJECTION, SOLUTION EPIDURAL; INFILTRATION; INTRACAUDAL; PERINEURAL ONCE AS NEEDED
Status: CANCELLED | OUTPATIENT
Start: 2024-12-10

## 2024-12-10 RX ORDER — SODIUM CHLORIDE 0.9 % (FLUSH) 0.9 %
10 SYRINGE (ML) INJECTION AS NEEDED
Status: DISCONTINUED | OUTPATIENT
Start: 2024-12-10 | End: 2024-12-10 | Stop reason: HOSPADM

## 2024-12-10 RX ORDER — SODIUM CHLORIDE 9 MG/ML
500 INJECTION, SOLUTION INTRAVENOUS CONTINUOUS PRN
Status: DISCONTINUED | OUTPATIENT
Start: 2024-12-10 | End: 2024-12-10 | Stop reason: HOSPADM

## 2024-12-10 RX ADMIN — PROPOFOL 50 MG: 10 INJECTION, EMULSION INTRAVENOUS at 10:22

## 2024-12-10 RX ADMIN — PROPOFOL 100 MG: 10 INJECTION, EMULSION INTRAVENOUS at 10:13

## 2024-12-10 RX ADMIN — PROPOFOL 100 MG: 10 INJECTION, EMULSION INTRAVENOUS at 10:18

## 2024-12-10 RX ADMIN — PROPOFOL 100 MG: 10 INJECTION, EMULSION INTRAVENOUS at 10:09

## 2024-12-10 RX ADMIN — Medication 10 ML: at 08:30

## 2024-12-10 NOTE — H&P
[facility]  Pre Procedure History & Physical    Chief Complaint:   History of polyps    Subjective     HPI:   Here for colonoscopy.  History of polyps    Past Medical History:   Past Medical History:   Diagnosis Date    Allergic unknown    Arthritis     Cataract eye surgery 5 years ago    Colon polyp 3 years ago    GERD (gastroesophageal reflux disease)     Hernia 20 years ago    History of transfusion     Hx of colonic polyp     Hypercholesteremia     Shingles        Past Surgical History:  Past Surgical History:   Procedure Laterality Date    CATARACT EXTRACTION Bilateral     COLONOSCOPY N/A 03/25/2019    -Normal examined duodenum. - Normal stomach. Biopsied. - Multiple fundic gland polyps. Biopsied. - Z-line regular, 42 cm from the incisors    COLONOSCOPY W/ POLYPECTOMY  04/17/2014    Tubular adenoma at 60 cm repeat exam in 3 years    ENDOSCOPY  04/17/2014    Small HH otherwise normal exam    ENDOSCOPY N/A 03/20/2023    Dr. Steele-Biopsy fundus polyp: Gastric fundic gland polyp    EYE SURGERY  2019    retina    HAND SURGERY      July 2022    KNEE SURGERY      x 2    LUNG SURGERY      Due to complicated pneumonia    ROTATOR CUFF REPAIR      UPPER GASTROINTESTINAL ENDOSCOPY  Dont recall       Family History:  Family History   Problem Relation Age of Onset    No Known Problems Mother     No Known Problems Father     Colon cancer Neg Hx     Colon polyps Neg Hx     Esophageal cancer Neg Hx        Social History:   reports that he quit smoking about 32 years ago. His smoking use included cigarettes. He started smoking about 50 years ago. He has a 8 pack-year smoking history. He has quit using smokeless tobacco.  His smokeless tobacco use included chew. He reports current alcohol use of about 6.0 standard drinks of alcohol per week. He reports that he does not use drugs.    Medications:   Prior to Admission medications    Medication Sig Start Date End Date Taking? Authorizing Provider  "  dapsone 100 MG tablet Please take 1 tab PO daily 4/28/23  Yes Simran Resendiz APRN   sodium-potassium-magnesium sulfates (Suprep Bowel Prep Kit) 17.5-3.13-1.6 GM/177ML solution oral solution Take as directed by office instructions provided 9/3/24  Yes Esther Moore APRN   esomeprazole (nexIUM) 40 MG capsule Take 1 capsule by mouth Every Morning Before Breakfast. 12/14/23   Juanito Biggs MD   gabapentin (NEURONTIN) 100 MG capsule Take 1 capsule by mouth 3 (Three) Times a Day. 8/6/24   Sebastian Flores MD       Allergies:  Gluten meal and Statins    Objective     Blood pressure 147/89, pulse 58, temperature 97.7 °F (36.5 °C), temperature source Temporal, resp. rate 22, height 182.9 cm (72\"), weight 86.2 kg (190 lb), SpO2 97%.    Physical Exam   Constitutional: Pt is oriented to person, place, and in no distress.   HENT: Mouth/Throat: Oropharynx is clear.   Cardiovascular: Normal rate, regular rhythm.    Pulmonary/Chest: Effort normal. No respiratory distress. No  wheezes.   Abdominal: Soft. Non-distended.  Skin: Skin is warm and dry.   Psychiatric: Mood, memory, affect and judgment appear normal.     Assessment & Plan     Diagnosis:  History of polyps    Anticipated Surgical Procedure:    Proceed with colonoscopy as scheduled    The following major R/B/A were discussed with the patient, however the list is not all inclusive . Risk:  Bleeding (immediate and delayed), perforation (rupture or tear), reaction to medication, missed lesion/cancer, pain during the procedure, infection, need for surgery, need for ostomy, need for mechanical ventilation (breathing machine), death.  Benefits: removal of polyp/tissue, burn/clip/or inject to stop bleeding, removal of foreign body, dilate any stricture.  Alternatives: Xray or CT, surgery, do nothing with associated risk   The patient was given time to ask question and received explanation, and agrees to proceed as per History and Physical.   No guarantee given or " expressed.    EMR Dragon/transcription disclaimer: Much of this encounter note is an electronic transcription/translation of spoken language to printed text.  The electronic translation of spoken language may permit erroneous, or at times, nonsensical words or phrases to be inadvertently transcribed.  Although I have reviewed the note for such errors, some may still exist.    Kota Silvestre MD  10:09 CST  12/10/2024

## 2024-12-10 NOTE — ANESTHESIA PREPROCEDURE EVALUATION
Anesthesia Evaluation     Patient summary reviewed   no history of anesthetic complications:   NPO Solid Status: > 8 hours  NPO Liquid Status: > 4 hours           Airway   Mallampati: I  TM distance: >3 FB  Neck ROM: full  Dental    (+) partials    Pulmonary    (+) a smoker Former,  Cardiovascular   Exercise tolerance: excellent (>7 METS)    (+) hyperlipidemia      Neuro/Psych- negative ROS  GI/Hepatic/Renal/Endo    (+) hiatal hernia, GERD    Musculoskeletal     Abdominal    Substance History      OB/GYN          Other                      Anesthesia Plan    ASA 2     MAC     intravenous induction     Anesthetic plan, risks, benefits, and alternatives have been provided, discussed and informed consent has been obtained with: patient.

## 2024-12-10 NOTE — ANESTHESIA POSTPROCEDURE EVALUATION
"Patient: Chao Yadav Jr.    Procedure Summary       Date: 12/10/24 Room / Location: [facility] ENDOSCOPY 4 /  [facility]    Anesthesia Start: 1006 Anesthesia Stop: 1028    Procedure: COLONOSCOPY WITH ANESTHESIA Diagnosis:       Hx of adenomatous colonic polyps      (Hx of adenomatous colonic polyps [Z86.010])    Surgeons: Kota Silvestre MD Provider: Shekhar Miranda CRNA    Anesthesia Type: MAC ASA Status: 2            Anesthesia Type: MAC    Vitals  Vitals Value Taken Time   /88 12/10/24 1041   Temp     Pulse 79 12/10/24 1042   Resp 21 12/10/24 1040   SpO2 95 % 12/10/24 1042   Vitals shown include unfiled device data.        Post Anesthesia Care and Evaluation    Patient location during evaluation: PHASE II  Patient participation: complete - patient participated  Level of consciousness: awake and alert  Pain score: 0  Pain management: adequate    Airway patency: patent  Anesthetic complications: No anesthetic complications  PONV Status: none  Cardiovascular status: stable and acceptable  Respiratory status: acceptable  Hydration status: acceptable    Comments: Blood pressure 121/88, pulse 77, temperature 97.7 °F (36.5 °C), temperature source Temporal, resp. rate 21, height 182.9 cm (72\"), weight 86.2 kg (190 lb), SpO2 96%.    No anesthesia care post op    " no

## 2024-12-16 ENCOUNTER — TELEPHONE (OUTPATIENT)
Dept: GASTROENTEROLOGY | Facility: CLINIC | Age: 69
End: 2024-12-16
Payer: MEDICARE

## 2025-03-31 ENCOUNTER — OFFICE VISIT (OUTPATIENT)
Dept: FAMILY MEDICINE CLINIC | Facility: CLINIC | Age: 70
End: 2025-03-31
Payer: MEDICARE

## 2025-03-31 VITALS
BODY MASS INDEX: 27.09 KG/M2 | RESPIRATION RATE: 16 BRPM | DIASTOLIC BLOOD PRESSURE: 87 MMHG | HEART RATE: 66 BPM | OXYGEN SATURATION: 95 % | HEIGHT: 72 IN | SYSTOLIC BLOOD PRESSURE: 165 MMHG | TEMPERATURE: 98.4 F | WEIGHT: 200 LBS

## 2025-03-31 DIAGNOSIS — R07.81 RIB PAIN ON RIGHT SIDE: Primary | ICD-10-CM

## 2025-03-31 RX ORDER — METHYLPREDNISOLONE 4 MG/1
TABLET ORAL
Qty: 21 TABLET | Refills: 0 | Status: SHIPPED | OUTPATIENT
Start: 2025-03-31

## 2025-03-31 RX ORDER — DEXAMETHASONE SODIUM PHOSPHATE 4 MG/ML
8 INJECTION, SOLUTION INTRA-ARTICULAR; INTRALESIONAL; INTRAMUSCULAR; INTRAVENOUS; SOFT TISSUE ONCE
Status: COMPLETED | OUTPATIENT
Start: 2025-03-31 | End: 2025-03-31

## 2025-03-31 RX ADMIN — DEXAMETHASONE SODIUM PHOSPHATE 8 MG: 4 INJECTION, SOLUTION INTRA-ARTICULAR; INTRALESIONAL; INTRAMUSCULAR; INTRAVENOUS; SOFT TISSUE at 13:44

## 2025-03-31 NOTE — PROGRESS NOTES
"Pt states he's been experiencing back pain [id number]. The pain radiates from the right flank up towards the rib cage while resting and towards the groin while sitting. He describes the pain as a bruised sensation but he has experienced no traumatic injury. He reports no tenderness to the touch or swelling. Chief Complaint  Back Pain    Subjective    History of Present Illness      Patient presents to [facility] for   Back Pain         History of Present Illness  The patient presents for evaluation of right rib pain.    He reports a history of intermittent lower back pain that typically radiates down the hip and leg, which usually resolves spontaneously. However, he is currently experiencing persistent pain in his right ribs, described as a burning sensation that extends to the side of the ribs and exhibits some mobility. The onset of this pain was approximately 5 to 6 weeks ago, following an incident where he exerted himself while changing truck tires. He also mentions a recent episode of overexertion involving a chainsaw starter rope. The pain is characterized as a cramping sensation, with no associated numbness. He does not recall any specific injury or trauma to the area and reports no audible cracking sound at the time of onset.    Review of Systems   Musculoskeletal:  Positive for back pain.       I have reviewed and agree with the HPI information as above.  Juanito Biggs MD     Objective   Vital Signs:   /87 (BP Location: Left arm, Patient Position: Sitting, Cuff Size: Adult)   Pulse 66   Temp 98.4 °F (36.9 °C)   Resp 16   Ht 182.9 cm (72\")   Wt 90.7 kg (200 lb)   SpO2 95%   BMI 27.12 kg/m²            Physical Exam  Constitutional:       Appearance: Normal appearance. He is normal weight.   Cardiovascular:      Rate and Rhythm: Normal rate and regular rhythm.      Heart sounds: Normal heart sounds.   Pulmonary:      Effort: Pulmonary effort is normal.      Breath " sounds: Normal breath sounds.   Musculoskeletal:      Thoracic back: Spasms, tenderness and bony tenderness present.        Back:       Comments: Tender to palpation   Neurological:      Mental Status: He is alert.   Psychiatric:         Mood and Affect: Mood normal.         Behavior: Behavior normal.          LANG-7: Over the last two weeks, how often have you been bothered by the following problems?  Feeling nervous, anxious or on edge: Not at all  Not being able to stop or control worrying: Not at all  Worrying too much about different things: Not at all  Trouble Relaxing: Not at all  Being so restless that it is hard to sit still: Not at all  Becoming easily annoyed or irritable: Not at all  Feeling afraid as if something awful might happen: Not at all  LANG 7 Total Score: 0  If you checked any problems, how difficult have these problems made it for you to do your work, take care of things at home, or get along with other people: Not difficult at all    PHQ-2 Depression Screening    Little interest or pleasure in doing things? Not at all   Feeling down, depressed, or hopeless? Not at all   PHQ-2 Total Score 0      PHQ-9 Depression Screening  Little interest or pleasure in doing things? Not at all   Feeling down, depressed, or hopeless? Not at all   PHQ-2 Total Score 0   Trouble falling or staying asleep, or sleeping too much?     Feeling tired or having little energy?     Poor appetite or overeating?     Feeling bad about yourself - or that you are a failure or have let yourself or your family down?     Trouble concentrating on things, such as reading the newspaper or watching television?     Moving or speaking so slowly that other people could have noticed? Or the opposite - being so fidgety or restless that you have been moving around a lot more than usual?     Thoughts that you would be better off dead, or of hurting yourself in some way?     PHQ-9 Total Score     If you checked off any problems, how difficult  have these problems made it for you to do your work, take care of things at home, or get along with other people? Not difficult at all           Result Review  Data Reviewed:                   Assessment and Plan      Diagnoses and all orders for this visit:    1. Rib pain on right side (Primary)  -     dexAMETHasone (DECADRON) injection 8 mg  -     methylPREDNISolone (MEDROL) 4 MG dose pack; Take as directed on package instructions.  Dispense: 21 tablet; Refill: 0  -     tiZANidine (Zanaflex) 4 MG tablet; Take 1 tablet by mouth At Night As Needed for Muscle Spasms.  Dispense: 30 tablet; Refill: 1        Assessment & Plan  1. Right rib pain.  The pain is likely due to a strain in the intercostal muscles, causing spasms and potential nerve irritation. The healing process may be prolonged due to the inability to immobilize the area. An x-ray is not deemed necessary at this time. A steroid injection will be administered today to alleviate inflammation. A tapering course of steroid Dosepak (5-4-3-2-1) will commence tomorrow, and a muscle relaxer will be taken at night to manage spasms. If there is no improvement in symptoms within 4 weeks, an MRI will be considered.        Follow Up   Return in about 4 weeks (around 4/28/2025) for Recheck.  Patient was given instructions and counseling regarding his condition or for health maintenance advice. Please see specific information pulled into the AVS if appropriate.     Patient or patient representative verbalized consent for the use of Ambient Listening during the visit with  Juanito Biggs MD for chart documentation. 3/31/2025  13:35 CDT

## 2025-03-31 NOTE — PROGRESS NOTES
After obtaining consent, and per orders of Dr. Biggs, injection of Dexamethasone 8mg via IMM given by Michelle Luther MA. Patient instructed to remain in clinic for 20 minutes afterwards, and to report any adverse reaction to me immediately. Pt voiced understanding

## 2025-04-12 PROBLEM — K04.7 DENTAL INFECTION: Status: ACTIVE | Noted: 2025-04-12

## 2025-06-10 ENCOUNTER — TRANSCRIBE ORDERS (OUTPATIENT)
Dept: ADMINISTRATIVE | Age: 70
End: 2025-06-10

## 2025-06-10 DIAGNOSIS — J86.9: Primary | ICD-10-CM

## 2025-07-14 ENCOUNTER — HOSPITAL ENCOUNTER (OUTPATIENT)
Dept: PULMONOLOGY | Age: 70
Discharge: HOME | End: 2025-07-14
Payer: COMMERCIAL

## 2025-07-14 DIAGNOSIS — J86.9: ICD-10-CM

## 2025-07-14 PROCEDURE — 6360000002 HC RX W HCPCS: Performed by: INTERNAL MEDICINE

## 2025-07-14 PROCEDURE — 94729 DIFFUSING CAPACITY: CPT

## 2025-07-14 PROCEDURE — 94726 PLETHYSMOGRAPHY LUNG VOLUMES: CPT

## 2025-07-14 PROCEDURE — 94060 EVALUATION OF WHEEZING: CPT

## 2025-07-14 RX ORDER — ALBUTEROL SULFATE 0.83 MG/ML
2.5 SOLUTION RESPIRATORY (INHALATION) ONCE
Status: COMPLETED | OUTPATIENT
Start: 2025-07-14 | End: 2025-07-14

## 2025-07-14 RX ADMIN — ALBUTEROL SULFATE 2.5 MG: 2.5 SOLUTION RESPIRATORY (INHALATION) at 08:28

## 2025-07-14 NOTE — PROCEDURES
Media Information      Pulmonary Function Study    Interpretation:    The FVC is Normal. FEV1 is Normal. FEV1/FVC ratio is reduced. After bronchodilator therapy there was no significant change in FEV1. Total lung capacity is Normal. Residual volume is Normal.      Diffusing lung capacity when corrected for alveolar volume is Normal.         Impression:    Mild chronic obstructive pulmonary disease.         Becky Meza MD, FCCP, [facility]

## 2025-07-18 ENCOUNTER — TRANSCRIBE ORDERS (OUTPATIENT)
Dept: ADMINISTRATIVE | Facility: HOSPITAL | Age: 70
End: 2025-07-18
Payer: MEDICARE

## 2025-07-18 ENCOUNTER — LAB (OUTPATIENT)
Dept: LAB | Facility: HOSPITAL | Age: 70
End: 2025-07-18
Payer: MEDICARE

## 2025-07-18 DIAGNOSIS — L13.0 DERMATITIS HERPETIFORMIS: Primary | ICD-10-CM

## 2025-07-18 DIAGNOSIS — L13.0 DERMATITIS HERPETIFORMIS: ICD-10-CM

## 2025-07-18 LAB
ALBUMIN SERPL-MCNC: 4 G/DL (ref 3.5–5)
ALBUMIN/GLOB SERPL: 1.7 G/DL (ref 1.1–2.5)
ALP SERPL-CCNC: 83 U/L (ref 24–120)
ALT SERPL W P-5'-P-CCNC: 20 U/L (ref 0–50)
ANION GAP SERPL CALCULATED.3IONS-SCNC: 6 MMOL/L (ref 4–13)
AST SERPL-CCNC: 25 U/L (ref 7–45)
AUTO MIXED CELLS #: 0.6 10*3/MM3 (ref 0.1–2.6)
AUTO MIXED CELLS %: 9.8 % (ref 0.1–24)
BILIRUB SERPL-MCNC: 0.6 MG/DL (ref 0.1–1)
BUN SERPL-MCNC: 15 MG/DL (ref 5–21)
BUN/CREAT SERPL: 16.7
CALCIUM SPEC-SCNC: 9.1 MG/DL (ref 8.6–10.5)
CHLORIDE SERPL-SCNC: 108 MMOL/L (ref 98–110)
CO2 SERPL-SCNC: 25 MMOL/L (ref 24–31)
CREAT SERPL-MCNC: 0.9 MG/DL (ref 0.5–1.4)
EGFRCR SERPLBLD CKD-EPI 2021: 91.9 ML/MIN/1.73
ERYTHROCYTE [DISTWIDTH] IN BLOOD BY AUTOMATED COUNT: 12.6 % (ref 12.3–15.4)
GLOBULIN UR ELPH-MCNC: 2.3 GM/DL
GLUCOSE SERPL-MCNC: 77 MG/DL (ref 65–99)
HCT VFR BLD AUTO: 42.4 % (ref 37.5–51)
HGB BLD-MCNC: 14.3 G/DL (ref 13–17.7)
LYMPHOCYTES # BLD AUTO: 1.9 10*3/MM3 (ref 0.7–3.1)
LYMPHOCYTES NFR BLD AUTO: 30.9 % (ref 19.6–45.3)
MCH RBC QN AUTO: 31 PG (ref 26.6–33)
MCHC RBC AUTO-ENTMCNC: 33.7 G/DL (ref 31.5–35.7)
MCV RBC AUTO: 91.8 FL (ref 79–97)
NEUTROPHILS NFR BLD AUTO: 3.8 10*3/MM3 (ref 1.7–7)
NEUTROPHILS NFR BLD AUTO: 59.3 % (ref 42.7–76)
PLATELET # BLD AUTO: 222 10*3/MM3 (ref 140–450)
PMV BLD AUTO: 9.3 FL (ref 6–12)
POTASSIUM SERPL-SCNC: 4.7 MMOL/L (ref 3.5–5.3)
PROT SERPL-MCNC: 6.3 G/DL (ref 6.3–8.7)
RBC # BLD AUTO: 4.62 10*6/MM3 (ref 4.14–5.8)
SODIUM SERPL-SCNC: 139 MMOL/L (ref 135–145)
WBC NRBC COR # BLD AUTO: 6.3 10*3/MM3 (ref 3.4–10.8)

## 2025-07-18 PROCEDURE — 85025 COMPLETE CBC W/AUTO DIFF WBC: CPT

## 2025-07-18 PROCEDURE — 36415 COLL VENOUS BLD VENIPUNCTURE: CPT

## 2025-07-18 PROCEDURE — 80053 COMPREHEN METABOLIC PANEL: CPT

## 2025-07-20 PROBLEM — J86.9: Status: ACTIVE | Noted: 2025-07-20

## 2025-07-22 ENCOUNTER — TELEPHONE (OUTPATIENT)
Dept: FAMILY MEDICINE CLINIC | Facility: CLINIC | Age: 70
End: 2025-07-22

## 2025-07-29 ENCOUNTER — OFFICE VISIT (OUTPATIENT)
Dept: FAMILY MEDICINE CLINIC | Facility: CLINIC | Age: 70
End: 2025-07-29
Payer: MEDICARE

## 2025-07-29 VITALS
BODY MASS INDEX: 26.14 KG/M2 | DIASTOLIC BLOOD PRESSURE: 83 MMHG | TEMPERATURE: 98.4 F | HEART RATE: 66 BPM | SYSTOLIC BLOOD PRESSURE: 130 MMHG | HEIGHT: 72 IN | WEIGHT: 193 LBS | OXYGEN SATURATION: 99 %

## 2025-07-29 DIAGNOSIS — K21.9 GASTROESOPHAGEAL REFLUX DISEASE WITHOUT ESOPHAGITIS: Primary | ICD-10-CM

## 2025-07-29 DIAGNOSIS — K44.9 HIATAL HERNIA WITH GERD: ICD-10-CM

## 2025-07-29 DIAGNOSIS — K21.9 HIATAL HERNIA WITH GERD: ICD-10-CM

## 2025-07-29 DIAGNOSIS — M71.21 BAKER'S CYST OF KNEE, RIGHT: ICD-10-CM

## 2025-07-29 DIAGNOSIS — L98.9 LESION OF SKIN OF SCALP: ICD-10-CM

## 2025-07-29 RX ORDER — ESOMEPRAZOLE MAGNESIUM 40 MG/1
40 CAPSULE, DELAYED RELEASE ORAL
Qty: 90 CAPSULE | Refills: 3 | Status: SHIPPED | OUTPATIENT
Start: 2025-07-29

## 2025-07-29 RX ORDER — DEXLANSOPRAZOLE 60 MG/1
60 CAPSULE, DELAYED RELEASE ORAL DAILY
Qty: 30 CAPSULE | Refills: 0 | Status: SHIPPED | OUTPATIENT
Start: 2025-07-29 | End: 2025-08-28

## 2025-07-29 NOTE — PROGRESS NOTES
Chief Complaint  Bloated (With pain after eating-need Nexium refilled.  )    Subjective    History of Present Illness      Patient presents to [facility] for   Abdominal Pain  Chronicity:  Recurrent  Onset:  1 to 4 weeks ago  Onset quality:  Sudden  Frequency:  Daily  Progression since onset:  Improving  Episode duration:  Weeks  Pain location:  Epigastric region  Pain - numeric:  5/10  Pain quality:  Aching, burning, a sensation of fullness and sharp  Radiates to:  Chest  Associated symptoms: no anorexia, no arthralgias, no belching, no constipation, no diarrhea, no dysuria, no fever, no flatus, no frequency, no hematochezia, no hematuria, no melena, no myalgias, no nausea, no vomiting and no weight loss    Aggravated by:  Eating  Relieved by:  Recumbency       History of Present Illness  The patient presents for evaluation of hiatal hernia, Baker's cyst, and skin lesion.    He has been managing a hiatal hernia for several years, with the last two years being relatively stable. However, in June 2025, he experienced an exacerbation of his symptoms after lifting heavy objects. This led to reflux, throat burning, and coughing. He also underwent a pulmonary function test due to his exposure to chemicals, which revealed mild COPD. Two days post-test, he experienced severe pain at night, leading to sleep disturbances and difficulty eating. His diet was limited to cottage cheese and yogurt, resulting in weight loss. He reports no gallbladder pain. He believes his condition is worsening with age. He has not experienced any breakthrough symptoms since the previous Friday and has not taken Gaviscon. He finds Dexilant effective but costly. He has a coupon from [employer] for a 30-day supply and plans to get a 6-month supply of Nexium from [facility]. He prefers Nexium 40 mg over 20 mg. He has a [employer] coupon that allows him to purchase Nexium for $14 at [employer]. He used to take Dexilant  "regularly, which he found more effective than Nexium. He has been taking Nexium every other day, occasionally missing two doses. He sought relief from Dr. Sage Estrada in [place], who manually reduced the hernia, providing significant relief. This intervention was repeated last week, effectively alleviating his pain and managing his reflux. He has discontinued Gaviscon and reports feeling well. He is currently not interested in surgery. His last endoscopy was performed two years ago.    He has noticed a fluid-filled lump behind his right knee, which he believes to be a Baker's cyst. It is increasing in size and causing tightness in his knee, although it is not painful. He is unsure if it will resolve spontaneously or require drainage.    He has a small skin lesion that recently appeared. He has an appointment with a dermatologist in two weeks for this issue. He has been prescribed dapsone for gluten intolerance.    Social History:  Hobbies: He enjoys working on his jeep and lifting heavy objects.  Diet: He was eating cottage cheese and yogurt during his recent exacerbation.  Sleep: He experienced sleep disturbances due to severe pain.    Review of Systems   Constitutional:  Negative for fever and unexpected weight loss.   Gastrointestinal:  Positive for abdominal pain. Negative for anorexia, constipation, diarrhea, flatus, hematochezia, melena, nausea and vomiting.   Genitourinary:  Negative for dysuria, frequency and hematuria.   Musculoskeletal:  Negative for arthralgias and myalgias.       I have reviewed and agree with the HPI information as above.  Juanito Biggs MD     Objective   Vital Signs:   /83 (BP Location: Left arm, Patient Position: Sitting, Cuff Size: Adult)   Pulse 66   Temp 98.4 °F (36.9 °C) (Infrared)   Ht 182.9 cm (72\")   Wt 87.5 kg (193 lb)   SpO2 99%   BMI 26.18 kg/m²            Physical Exam  Constitutional:       Appearance: Normal appearance. He is normal weight. "   Cardiovascular:      Rate and Rhythm: Normal rate and regular rhythm.      Heart sounds: Normal heart sounds.   Pulmonary:      Effort: Pulmonary effort is normal.      Breath sounds: Normal breath sounds.   Neurological:      Mental Status: He is alert.   Psychiatric:         Mood and Affect: Mood normal.         Behavior: Behavior normal.                  Result Review  Data Reviewed:                   Assessment and Plan      Diagnoses and all orders for this visit:    1. Gastroesophageal reflux disease without esophagitis (Primary)  -     esomeprazole (nexIUM) 40 MG capsule; Take 1 capsule by mouth Every Morning Before Breakfast.  Dispense: 90 capsule; Refill: 3  -     dexlansoprazole (Dexilant) 60 MG capsule; Take 1 capsule by mouth Daily for 30 days.  Dispense: 30 capsule; Refill: 0    2. Hiatal hernia with GERD  Assessment & Plan:  Did well until lately   heavy lifting    Orders:  -     esomeprazole (nexIUM) 40 MG capsule; Take 1 capsule by mouth Every Morning Before Breakfast.  Dispense: 90 capsule; Refill: 3  -     dexlansoprazole (Dexilant) 60 MG capsule; Take 1 capsule by mouth Daily for 30 days.  Dispense: 30 capsule; Refill: 0    3. Lesion of skin of scalp  Assessment & Plan:  Has appt with pad derm already      4. Baker's cyst of knee, right        Assessment & Plan  1. Hiatal Hernia.  - Experiencing worsening symptoms of reflux and burning in the throat, likely exacerbated by lifting heavy objects.  - Managing with Nexium 40 mg every other day and has found relief by elevating the head of the bed by 6 inches.  - Advised to avoid eating after 5:00 PM and to go to bed with an empty stomach.  - Prescription for Nexium 40 mg will be sent to pharmacy. Additionally, a prescription for Dexilant will be sent to pharmacy.    2. Baker's Cyst.  - Fluid lump behind the right knee, identified as a Baker's cyst, likely due to knee arthritis.  - No drainage recommended at this time.  - Advised to monitor the cyst  and manage any underlying knee arthritis.  - No specific treatment prescribed for the cyst.    3. Skin Lesion.  - Small skin lesion recently appeared.  - Appointment with a dermatologist in two weeks for this issue.  - No immediate intervention required.        Follow Up   No follow-ups on file.  Patient was given instructions and counseling regarding his condition or for health maintenance advice. Please see specific information pulled into the AVS if appropriate.     Patient or patient representative verbalized consent for the use of Ambient Listening during the visit with  Juanito Biggs MD for chart documentation. 7/29/2025  15:13 CDT

## 2025-08-19 ENCOUNTER — TELEPHONE (OUTPATIENT)
Dept: FAMILY MEDICINE CLINIC | Facility: CLINIC | Age: 70
End: 2025-08-19
Payer: MEDICARE

## (undated) DEVICE — MASK,OXYGEN,MED CONC,ADLT,7' TUB, UC: Brand: PENDING

## (undated) DEVICE — SENSR O2 OXIMAX FNGR A/ 18IN NONSTR

## (undated) DEVICE — YANKAUER,BULB TIP WITH VENT: Brand: ARGYLE

## (undated) DEVICE — CONMED SCOPE SAVER BITE BLOCK, 20X27 MM: Brand: SCOPE SAVER

## (undated) DEVICE — THE SINGLE USE ETRAP – POLYP TRAP IS USED FOR SUCTION RETRIEVAL OF ENDOSCOPICALLY REMOVED POLYPS.: Brand: ETRAP

## (undated) DEVICE — THE CHANNEL CLEANING BRUSH IS A NYLON FLEXI BRUSH ATTACHED TO A FLEXIBLE PLASTIC SHEATH DESIGNED TO SAFELY REMOVE DEBRIS FROM FLEXIBLE ENDOSCOPES.

## (undated) DEVICE — CUFF,BP,DISP,1 TUBE,ADULT,HP: Brand: MEDLINE

## (undated) DEVICE — Device: Brand: DEFENDO AIR/WATER/SUCTION AND BIOPSY VALVE

## (undated) DEVICE — TBG SMPL FLTR LINE NASL 02/C02 A/ BX/100

## (undated) DEVICE — SINGLE-USE POLYPECTOMY SNARE: Brand: CAPTIVATOR II

## (undated) DEVICE — ENDOGATOR AUXILIARY WATER JET CONNECTOR: Brand: ENDOGATOR

## (undated) DEVICE — FRCP BIOP COLD ENDOJAW ALLGTR W/NDL 2.8X2300MM BLU

## (undated) DEVICE — ENDOCUFF VISION PRP SM 10.4